# Patient Record
Sex: FEMALE
[De-identification: names, ages, dates, MRNs, and addresses within clinical notes are randomized per-mention and may not be internally consistent; named-entity substitution may affect disease eponyms.]

---

## 2017-06-01 ENCOUNTER — HOSPITAL ENCOUNTER (EMERGENCY)
Dept: HOSPITAL 31 - C.ER | Age: 37
Discharge: HOME | End: 2017-06-01
Payer: COMMERCIAL

## 2017-06-01 VITALS
OXYGEN SATURATION: 98 % | SYSTOLIC BLOOD PRESSURE: 110 MMHG | RESPIRATION RATE: 14 BRPM | TEMPERATURE: 98.5 F | DIASTOLIC BLOOD PRESSURE: 60 MMHG

## 2017-06-01 VITALS — BODY MASS INDEX: 23.6 KG/M2

## 2017-06-01 VITALS — HEART RATE: 80 BPM

## 2017-06-01 DIAGNOSIS — J32.3: ICD-10-CM

## 2017-06-01 DIAGNOSIS — J01.00: Primary | ICD-10-CM

## 2017-06-01 LAB
ALBUMIN/GLOB SERPL: 1.3 {RATIO}
ALP SERPL-CCNC: 64 U/L
ALT SERPL-CCNC: 27 U/L
AST SERPL-CCNC: 23 U/L
BASOPHILS # BLD AUTO: 0.1 K/UL
BASOPHILS NFR BLD: 1.1 %
BILIRUB SERPL-MCNC: 0.9 MG/DL
BILIRUB UR-MCNC: NEGATIVE MG/DL
BUN SERPL-MCNC: 13 MG/DL
CALCIUM SERPL-MCNC: 9 MG/DL
CHLORIDE SERPL-SCNC: 101 MMOL/L
CO2 SERPL-SCNC: 27 MMOL/L
EOSINOPHIL # BLD AUTO: 0.1 K/UL
EOSINOPHIL NFR BLD: 1.7 %
ERYTHROCYTE [DISTWIDTH] IN BLOOD BY AUTOMATED COUNT: 17.4 %
GLOBULIN SER-MCNC: 3.2 GM/DL
GLUCOSE SERPL-MCNC: 85 MG/DL
GLUCOSE UR STRIP-MCNC: NORMAL MG/DL
HCT VFR BLD CALC: 29.7 %
KETONES UR STRIP-MCNC: NEGATIVE MG/DL
LEUKOCYTE ESTERASE UR-ACNC: (no result) LEU/UL
LYMPHOCYTES # BLD AUTO: 2.2 K/UL
LYMPHOCYTES NFR BLD AUTO: 37.9 %
MCH RBC QN AUTO: 22.4 PG
MCHC RBC AUTO-ENTMCNC: 31.3 G/DL
MCV RBC AUTO: 71.4 FL
MONOCYTES # BLD: 0.5 K/UL
MONOCYTES NFR BLD: 7.8 %
NRBC BLD AUTO-RTO: 0 %
PH UR STRIP: 5 [PH]
PLATELET # BLD: 345 K/UL
PMV BLD AUTO: 8.4 FL
POTASSIUM SERPL-SCNC: 3.7 MMOL/L
PROT SERPL-MCNC: 7.2 G/DL
PROT UR STRIP-MCNC: NEGATIVE MG/DL
RBC # UR STRIP: NEGATIVE /UL
RBC #/AREA URNS HPF: < 1 /HPF
SODIUM SERPL-SCNC: 136 MMOL/L
SP GR UR STRIP: 1.02
UROBILINOGEN UR-MCNC: NORMAL MG/DL
WBC # BLD AUTO: 5.8 K/UL
WBC #/AREA URNS HPF: 1 /HPF

## 2017-06-01 PROCEDURE — 84703 CHORIONIC GONADOTROPIN ASSAY: CPT

## 2017-06-01 PROCEDURE — 70450 CT HEAD/BRAIN W/O DYE: CPT

## 2017-06-01 PROCEDURE — 93005 ELECTROCARDIOGRAM TRACING: CPT

## 2017-06-01 PROCEDURE — 85025 COMPLETE CBC W/AUTO DIFF WBC: CPT

## 2017-06-01 PROCEDURE — 96375 TX/PRO/DX INJ NEW DRUG ADDON: CPT

## 2017-06-01 PROCEDURE — 84484 ASSAY OF TROPONIN QUANT: CPT

## 2017-06-01 PROCEDURE — 99285 EMERGENCY DEPT VISIT HI MDM: CPT

## 2017-06-01 PROCEDURE — 81001 URINALYSIS AUTO W/SCOPE: CPT

## 2017-06-01 PROCEDURE — 96374 THER/PROPH/DIAG INJ IV PUSH: CPT

## 2017-06-01 PROCEDURE — 82948 REAGENT STRIP/BLOOD GLUCOSE: CPT

## 2017-06-01 PROCEDURE — 80053 COMPREHEN METABOLIC PANEL: CPT

## 2017-06-01 NOTE — CT
EXAM:

  CT Head Without Intravenous Contrast



CLINICAL HISTORY:

  37 years old, female; Signs and symptoms; Other: Vertigo; Additional info: 

Headache



TECHNIQUE:

  Axial computed tomography images of the head/brain without intravenous 

contrast.  This CT exam was performed using one or more of the following dose 

reduction techniques:  automated exposure control, adjustment of the mA and/or 

kV according to patient size, and/or use of iterative reconstruction technique.



EXAM DATE/TIME:

  Exam ordered 6/1/2017 7:47 PM



COMPARISON:

  No relevant prior studies available.



FINDINGS:

  Brain:  Tiny calcification is noted in the right basal ganglia.  No 

hemorrhage.  No significant white matter disease.  No edema.

  Ventricles:  Unremarkable.  No ventriculomegaly.

  Bones/joints:  Unremarkable.  No acute fracture.

  Soft tissues:  Unremarkable.

  Sinuses:  There is an air-fluid level noted within the left maxillary sinus.  

Mucosal thickening is noted in the right sphenoid sinus.

  Mastoid air cells:  Unremarkable as visualized.  No mastoid effusion.



IMPRESSION:     

1. Normal CT brain



2 Acute left maxillary sinusitis. Chronic sinusitis of the right sphenoid

## 2017-06-01 NOTE — C.PDOC
History Of Present Illness





A 36 y/o female c/o headache for 3 months. Pt notes having vertigo yesterday. 

Pt reports nausea and palpitations but denies fever, chills, head trauma, 

visual changes, photophobia, or any other complaints.








Time Seen by Provider: 17 19:40


Chief Complaint (Nursing): Headache


History Per: Patient


History/Exam Limitations: no limitations


Onset/Duration Of Symptoms: Days


Current Symptoms Are (Timing): Still Present


Severity: Mild


Associated Symptoms: Nausea, Other (Vertigo).  denies: Photophobia, Blurred 

Vision


Recent travel outside of the [place]: No


Additional History Per: Patient





Past Medical History


Reviewed: Historical Data, Nursing Documentation, Vital Signs


Vital Signs: 


 Last Vital Signs











Temp  98.6 F   17 18:12


 


Pulse  80   17 18:12


 


Resp  18   17 18:12


 


BP  110/54 L  17 18:12


 


Pulse Ox  100   17 21:05














- Medical History


PMH: Anemia, Migraine


Surgical History: Appendectomy, Cholecystectomy


Family History: States: Unknown Family Hx





- Social History


Hx Alcohol Use: No


Hx Substance Use: No





- Immunization History


Hx Tetanus Toxoid Vaccination: No


Hx Influenza Vaccination: No


Hx Pneumococcal Vaccination: No





Review Of Systems


Except As Marked, All Systems Reviewed And Found Negative.


Constitutional: Negative for: Fever, Chills, Other (Head trauma)


Eyes: Negative for: Vision Change, Other (Photophobia)


Cardiovascular: Positive for: Palpitations


Gastrointestinal: Positive for: Nausea


Neurological: Positive for: Headache, Other (V)





Physical Exam





- Physical Exam


Appears: Non-toxic, No Acute Distress


Skin: Warm, Dry


Head: Atraumatic, Normacephalic


Eye(s): bilateral: Other (lateral nystagmus)


Oral Mucosa: Moist


Throat: Normal, No Exudate


Neck: Supple


Chest: Symmetrical


Cardiovascular: Rhythm Regular, No Murmur


Respiratory: Normal Breath Sounds, No Accessory Muscle Use, No Rales, No Rhonchi

, No Wheezing


Gastrointestinal/Abdominal: Soft, No Tenderness


Neurological/Psych: Oriented x3, Normal Speech, Normal Motor, Normal Sensation, 

Other (No focal deficit)





ED Course And Treatment





- Laboratory Results


Result Diagrams: 


 17 19:59





 17 19:59


ECG: Interpreted By Me, Viewed By Me


ECG Rhythm: Sinus Rhythm


ECG Interpretation: Normal, No Acute Changes


Interpretation Of ECG: NSR, normal tracings.


Rate From EC


O2 Sat by Pulse Oximetry: 100 (RA)


Pulse Ox Interpretation: Normal





- CT Scan/US


  ** CT Head w/o contrast


Other Rad Studies (CT/US): Interpreted By Me, Read By Radiologist


CT/US Interpretation: EXAM:  CT Head Without Intravenous Contrast.  CLINICAL 

HISTORY:  37 years old, female; Signs and symptoms; Other: Vertigo; Additional 

info: Headache.  TECHNIQUE:  Axial computed tomography images of the head/brain 

without intravenous contrast. This CT exam.  was performed using one or more of 

the following dose reduction techniques: automated exposure.  control, 

adjustment of the mA and/or kV according to patient size, and/or use of 

iterative.  reconstruction technique.  EXAM DATE/TIME:  Exam ordered 2017 7:

47 PM.  COMPARISON:  No relevant prior studies available.  FINDINGS:  Brain: 

Tiny calcification is noted in the right basal ganglia. No hemorrhage. No 

significant white.  matter disease. No edema.  Ventricles: Unremarkable. No 

ventriculomegaly.  Bones/joints: Unremarkable. No acute fracture.  Soft tissues

: Unremarkable.  Sinuses: There is an air-fluid level noted within the left 

maxillary sinus. Mucosal thickening is noted.  in the right sphenoid sinus.  

Mastoid air cells: Unremarkable as visualized. No mastoid effusion.  IMPRESSION

:  1. Normal CT brain.  2 Acute left maxillary sinusitis. Chronic sinusitis of 

the right sphenoid





Medical Decision Making


Medical Decision Making: 





Impression: 36 y/o female c/o headache for 3 months





Plans: CT Head, EKG, Toradol, Ativan, Antivert, IV fluids, reassess





Disposition


Counseled Patient/Family Regarding: Diagnosis





- Disposition


Referrals: 


[facility] [Outside]


Disposition: HOME/ ROUTINE


Disposition Time: 20:47


Condition: STABLE


Prescriptions: 


Amoxicillin [Amoxil 500 mg Cap] 500 mg PO TID #20 cap


Meclizine [Antivert] 12.5 mg PO TID #20 tab


Naproxen [Naprosyn Tab] 375 mg PO TIDPC #20 tab


Instructions:  Sinusitis (ED), Vertigo (ED), General Headache (ED)


Forms:  Gen Discharge Inst German


Print Language: [place]





- POA


Present On Arrival: None





- Clinical Impression


Clinical Impression: 


 Headache, Sinusitis, Vertigo








- Scribe Statement


The provider has reviewed the documentation as recorded by the Rameshibkee gutierrez





All medical record entries made by the Rameshibkee were at my direction and 

personally dictated by me. I have reviewed the chart and agree that the record 

accurately reflects my personal performance of the history, physical exam, 

medical decision making, and the department course for this patient. I have 

also personally directed, reviewed, and agree with the discharge instructions 

and disposition.

## 2017-06-02 NOTE — CARD
--------------- APPROVED REPORT --------------





EKG Measurement

Heart [id number]

OR [id number]

[id number]

[id number]

[id number]



<Conclusion>

Normal sinus rhythm

Normal ECG

## 2017-07-12 ENCOUNTER — HOSPITAL ENCOUNTER (EMERGENCY)
Dept: HOSPITAL 31 - C.ER | Age: 37
LOS: 1 days | Discharge: HOME | End: 2017-07-13
Payer: SELF-PAY

## 2017-07-12 VITALS — BODY MASS INDEX: 23.6 KG/M2

## 2017-07-12 VITALS — RESPIRATION RATE: 16 BRPM

## 2017-07-12 DIAGNOSIS — R10.31: ICD-10-CM

## 2017-07-12 DIAGNOSIS — D25.9: ICD-10-CM

## 2017-07-12 DIAGNOSIS — N39.0: Primary | ICD-10-CM

## 2017-07-12 LAB
BASOPHILS # BLD AUTO: 0.1 K/UL (ref 0–0.2)
BASOPHILS NFR BLD: 1.3 % (ref 0–2)
EOSINOPHIL # BLD AUTO: 0.1 K/UL (ref 0–0.7)
EOSINOPHIL NFR BLD: 1.9 % (ref 0–4)
ERYTHROCYTE [DISTWIDTH] IN BLOOD BY AUTOMATED COUNT: 16.9 % (ref 11.5–14.5)
HCG,QUALITATIVE URINE: NEGATIVE
HGB BLD-MCNC: 9.8 G/DL (ref 11–16)
LYMPHOCYTES # BLD AUTO: 2.5 K/UL (ref 1–4.3)
LYMPHOCYTES NFR BLD AUTO: 39.4 % (ref 20–40)
MCH RBC QN AUTO: 22.1 PG (ref 27–31)
MCHC RBC AUTO-ENTMCNC: 31 G/DL (ref 33–37)
MCV RBC AUTO: 71.3 FL (ref 81–99)
MONOCYTES # BLD: 0.6 K/UL (ref 0–0.8)
MONOCYTES NFR BLD: 9.5 % (ref 0–10)
NEUTROPHILS # BLD: 3.1 K/UL (ref 1.8–7)
NEUTROPHILS NFR BLD AUTO: 47.9 % (ref 50–75)
NRBC BLD AUTO-RTO: 0.1 % (ref 0–2)
PLATELET # BLD: 358 K/UL (ref 130–400)
PMV BLD AUTO: 8.4 FL (ref 7.2–11.7)
RBC # BLD AUTO: 4.44 MIL/UL (ref 3.8–5.2)
WBC # BLD AUTO: 6.4 K/UL (ref 4.8–10.8)

## 2017-07-12 PROCEDURE — 85025 COMPLETE CBC W/AUTO DIFF WBC: CPT

## 2017-07-12 PROCEDURE — 96365 THER/PROPH/DIAG IV INF INIT: CPT

## 2017-07-12 PROCEDURE — 96366 THER/PROPH/DIAG IV INF ADDON: CPT

## 2017-07-12 PROCEDURE — 83690 ASSAY OF LIPASE: CPT

## 2017-07-12 PROCEDURE — 84702 CHORIONIC GONADOTROPIN TEST: CPT

## 2017-07-12 PROCEDURE — 99284 EMERGENCY DEPT VISIT MOD MDM: CPT

## 2017-07-12 PROCEDURE — 87086 URINE CULTURE/COLONY COUNT: CPT

## 2017-07-12 PROCEDURE — 84703 CHORIONIC GONADOTROPIN ASSAY: CPT

## 2017-07-12 PROCEDURE — 81001 URINALYSIS AUTO W/SCOPE: CPT

## 2017-07-12 PROCEDURE — 74177 CT ABD & PELVIS W/CONTRAST: CPT

## 2017-07-12 PROCEDURE — 80053 COMPREHEN METABOLIC PANEL: CPT

## 2017-07-12 PROCEDURE — 96375 TX/PRO/DX INJ NEW DRUG ADDON: CPT

## 2017-07-13 VITALS
DIASTOLIC BLOOD PRESSURE: 62 MMHG | OXYGEN SATURATION: 100 % | SYSTOLIC BLOOD PRESSURE: 97 MMHG | HEART RATE: 64 BPM | TEMPERATURE: 98 F

## 2017-07-13 LAB
ALBUMIN SERPL-MCNC: 4.2 G/DL (ref 3.5–5)
ALBUMIN/GLOB SERPL: 1.1 {RATIO} (ref 1–2.1)
ALT SERPL-CCNC: 33 U/L (ref 9–52)
AST SERPL-CCNC: 30 U/L (ref 14–36)
BACTERIA #/AREA URNS HPF: (no result) /[HPF]
BILIRUB UR-MCNC: (no result) MG/DL
BUN SERPL-MCNC: 11 MG/DL (ref 7–17)
CALCIUM SERPL-MCNC: 9.2 MG/DL (ref 8.6–10.4)
GFR NON-AFRICAN AMERICAN: > 60
GLUCOSE UR STRIP-MCNC: NORMAL MG/DL
LEUKOCYTE ESTERASE UR-ACNC: (no result) LEU/UL
LIPASE: 125 U/L (ref 23–300)
PH UR STRIP: 5 [PH] (ref 5–8)
PROT UR STRIP-MCNC: (no result) MG/DL
RBC # UR STRIP: (no result) /UL
SP GR UR STRIP: 1.04 (ref 1–1.03)
SQUAMOUS EPITHIAL: 12 /HPF (ref 0–5)
URINE NITRATE: NEGATIVE
UROBILINOGEN UR-MCNC: NORMAL MG/DL (ref 0.2–1)

## 2017-07-13 NOTE — C.PDOC
History Of Present Illness


Patient is a 38 y/o female, with history of ovarian cyst, that presents to the 

ED for evaluation of severe lower abdominal pain associated with nausea, 

vomiting, and dysuria for the last 7 days. Otherwise, denies any diarrhea, 

hematuria, back pain, fever, chills, or any other associated symptoms at this 

time. 





Chief Complaint (Nursing): Abdominal Pain


History Per: Patient


History/Exam Limitations: no limitations


Onset/Duration Of Symptoms: Days (7)


Current Symptoms Are (Timing): Still Present


Severity: Severe


Location Of Pain/Discomfort: RLQ, LLQ


Radiation Of Pain To:: None


Quality Of Discomfort: "Pain"


Associated Symptoms: Nausea, Vomiting, Urinary Symptoms (dysuria).  denies: 

Fever, Chills, Diarrhea, Loss Of Appetite, Back Pain, Chest Pain, Constipation


Exacerbating Factors: None


Alleviating Factors: None


Recent travel outside of the [place]: No


Additional History Per: Patient


Abnormal Vaginal Bleeding: No





Past Medical History


Reviewed: Historical Data, Nursing Documentation, Vital Signs


Vital Signs: 


 Last Vital Signs











Temp  98.4 F   07/12/17 23:33


 


Pulse  75   07/12/17 23:33


 


Resp  16   07/12/17 23:33


 


BP  105/69   07/12/17 23:33


 


Pulse Ox  99   07/13/17 02:30














- Medical History


PMH: Anemia, Migraine


Surgical History: Appendectomy, Cholecystectomy


Family History: States: Unknown Family Hx





- Social History


Hx Alcohol Use: No


Hx Substance Use: No





- Immunization History


Hx Tetanus Toxoid Vaccination: No


Hx Influenza Vaccination: No


Hx Pneumococcal Vaccination: No





Review Of Systems


Except As Marked, All Systems Reviewed And Found Negative.


Constitutional: Negative for: Fever, Chills


Gastrointestinal: Positive for: Nausea, Vomiting, Abdominal Pain.  Negative for

: Diarrhea, Constipation, Hematemesis


Genitourinary: Positive for: Dysuria.  Negative for: Frequency, Incontinence, 

Hematuria


Musculoskeletal: Negative for: Back Pain





Physical Exam





- Physical Exam


Appears: Non-toxic, No Acute Distress


Skin: Normal Color, Warm, Dry


Head: Atraumatic, Normacephalic


Eye(s): bilateral: Normal Inspection, EOMI


Chest: Symmetrical


Cardiovascular: Rhythm Regular, No Murmur


Respiratory: Normal Breath Sounds, No Rales, No Rhonchi, No Wheezing


Gastrointestinal/Abdominal: Soft, Tenderness (RLQ, LLQ), No Distention, No 

Guarding, No Rebound


Extremity: Normal ROM


Neurological/Psych: Oriented x3, Normal Speech, Normal Cognition





ED Course And Treatment





- Laboratory Results


Result Diagrams: 


 07/12/17 23:53





 07/12/17 23:53


O2 Sat by Pulse Oximetry: 99 (on RA)


Pulse Ox Interpretation: Normal





- CT Scan/US


  ** Abd & pelvis CT


Other Rad Studies (CT/US): Read By Radiologist, Radiology Report Reviewed


CT/US Interpretation: FINDINGS:  Lower thorax: No acute findings.  ABDOMEN:  

Liver: Small cyst in the dome of the right hepatic lobe.  Gallbladder and bile 

ducts: Cholecystectomy.  No ductal dilation.  Pancreas: Unremarkable. No mass. 

No ductal dilation.  Spleen: Unremarkable. No splenomegaly.  Adrenals: 

Unremarkable. No mass.  Kidneys and ureters: Small cyst in the upper pole left 

kidney.  No hydronephrosis.  Stomach and bowel: Moderate fecal retention in the 

right and transverse colon consistent with.  constipation.  Appendix: Not seen.

  PELVIS:  Bladder: Unremarkable. No mass.  Reproductive: Probable fibroid in 

the anterior uterus.  ABDOMEN and PELVIS:  Intraperitoneal space: Unremarkable. 

No free air. No significant fluid collection.  Bones/joints: No acute fracture. 

No dislocation.  Soft tissues: Unremarkable.  Vasculature: Unremarkable. No 

abdominal aortic aneurysm.  Lymph nodes: Unremarkable. No enlarged lymph nodes.

  IMPRESSION:  Moderate fecal retention in the right and transverse colon 

consistent with constipation.


Progress Note: Blood work, urinalysis, Abd & pelvis CT ordered and reviewed. 

Patient was treated with Iohexol, Toradol, and IV fluids in the ER.





Disposition


Counseled Patient/Family Regarding: Diagnosis





- Disposition


Referrals: 


[facility] [Outside]


Disposition: HOME/ ROUTINE


Disposition Time: 02:26


Condition: STABLE


Prescriptions: 


Ciprofloxacin [Cipro] 1 tab PO BID #14 tab


Phenazopyridine HCl [Pyridium] 200 mg PO TID #20 tablet


Instructions:  Urinary Tract Infection in Women (GEN), Abdominal Pain (ED), 

Uterine Fibroids (ED)


Forms:  Gen Discharge Inst Sami


Print Language: Sami





- POA


Present On Arrival: None





- Clinical Impression


Clinical Impression: 


 Abdominal pain, Urinary tract infection, Uterine fibroid








- Scribe Statement


The provider has reviewed the documentation as recorded by the Juve Vargas





All medical record entries made by the Rameshibkee were at my direction and 

personally dictated by me. I have reviewed the chart and agree that the record 

accurately reflects my personal performance of the history, physical exam, 

medical decision making, and the department course for this patient. I have 

also personally directed, reviewed, and agree with the discharge instructions 

and disposition.

## 2017-07-13 NOTE — CT
PROCEDURE:  CT Abdomen and Pelvis with contrast



HISTORY:

lower abd pain/ tenderness



COMPARISON:

None.



TECHNIQUE:

Contrast dose: 100 mL Visipaque 320



Radiation dose:



Total exam DLP = 279 mGy-cm.



This CT exam was performed using one or more of the following dose 

reduction techniques: Automated exposure control, adjustment of the 

mA and/or kV according to patient size, and/or use of iterative 

reconstruction technique.



FINDINGS:



LOWER THORAX:

Unremarkable. 



LIVER:

Probable small  benign cyst and/or hemangioma -posterior segment 

-right hepatic lobe- dome level. No ductal dilatation. 



GALLBLADDER AND BILE DUCTS:

Cholecystectomy. No pathological dilated ducts 



PANCREAS:

Unremarkable. No gross lesion or ductal dilatation.



SPLEEN:

Unremarkable. 



ADRENALS:

Unremarkable. No mass. 



KIDNEYS AND URETERS:

5 mm left renal upper pole intra cortical hypodense lesion -probable 

cyst. No hydronephrosis. No suspicious-appearing mass. 



VASCULATURE:

Unremarkable. No aortic aneurysm. 



BOWEL:

Redundancy and ptosis with marked stool retention-transverse colon. 

No obstruction. No gross mural thickening. 



APPENDIX:

Not definitively identified



No gross pericecal inflammatory changes 



PERITONEUM:

Unremarkable. No free fluid. No free air. 



LYMPH NODES:

Unremarkable. No enlarged lymph nodes. 



BLADDER:

Unremarkable. 



REPRODUCTIVE:

Probable anterior posterior fibroids (axial series 3, image 137).  



BONES:

No acute fracture. 



OTHER FINDINGS:

None.



IMPRESSION:

Appendix not identified. No pericecal inflammatory changes



Uterine fibroids - approximately 1 to 1.5 cm in size intramural to 

extramural of sub serosal in appearance mostly midline.This is an 

interval change in the initial V rad report. 



Comments: Preliminary report provided by V rad

## 2017-07-14 ENCOUNTER — HOSPITAL ENCOUNTER (EMERGENCY)
Dept: HOSPITAL 31 - C.ER | Age: 37
Discharge: HOME | End: 2017-07-14
Payer: SELF-PAY

## 2017-07-14 VITALS
HEART RATE: 65 BPM | TEMPERATURE: 97.7 F | SYSTOLIC BLOOD PRESSURE: 96 MMHG | DIASTOLIC BLOOD PRESSURE: 60 MMHG | OXYGEN SATURATION: 99 %

## 2017-07-14 VITALS — RESPIRATION RATE: 18 BRPM

## 2017-07-14 VITALS — BODY MASS INDEX: 23.6 KG/M2

## 2017-07-14 DIAGNOSIS — N39.0: Primary | ICD-10-CM

## 2017-07-14 LAB
ALBUMIN SERPL-MCNC: 3.5 G/DL (ref 3.5–5)
ALBUMIN/GLOB SERPL: 1.1 {RATIO} (ref 1–2.1)
ALT SERPL-CCNC: 41 U/L (ref 9–52)
AST SERPL-CCNC: 33 U/L (ref 14–36)
BASOPHILS # BLD AUTO: 0.1 K/UL (ref 0–0.2)
BASOPHILS NFR BLD: 1.1 % (ref 0–2)
BILIRUB UR-MCNC: NEGATIVE MG/DL
BUN SERPL-MCNC: 13 MG/DL (ref 7–17)
CALCIUM SERPL-MCNC: 8.5 MG/DL (ref 8.6–10.4)
EOSINOPHIL # BLD AUTO: 0.2 K/UL (ref 0–0.7)
EOSINOPHIL NFR BLD: 2.9 % (ref 0–4)
ERYTHROCYTE [DISTWIDTH] IN BLOOD BY AUTOMATED COUNT: 16.8 % (ref 11.5–14.5)
GFR NON-AFRICAN AMERICAN: > 60
GLUCOSE UR STRIP-MCNC: NORMAL MG/DL
HGB BLD-MCNC: 8.7 G/DL (ref 11–16)
LEUKOCYTE ESTERASE UR-ACNC: (no result) LEU/UL
LYMPHOCYTES # BLD AUTO: 2.5 K/UL (ref 1–4.3)
LYMPHOCYTES NFR BLD AUTO: 38 % (ref 20–40)
MCH RBC QN AUTO: 22.1 PG (ref 27–31)
MCHC RBC AUTO-ENTMCNC: 30.8 G/DL (ref 33–37)
MCV RBC AUTO: 72 FL (ref 81–99)
MONOCYTES # BLD: 0.5 K/UL (ref 0–0.8)
MONOCYTES NFR BLD: 7.2 % (ref 0–10)
NEUTROPHILS # BLD: 3.3 K/UL (ref 1.8–7)
NEUTROPHILS NFR BLD AUTO: 50.8 % (ref 50–75)
NRBC BLD AUTO-RTO: 0 % (ref 0–2)
PH UR STRIP: 7 [PH] (ref 5–8)
PLATELET # BLD: 306 K/UL (ref 130–400)
PMV BLD AUTO: 8.7 FL (ref 7.2–11.7)
PROT UR STRIP-MCNC: NEGATIVE MG/DL
RBC # BLD AUTO: 3.91 MIL/UL (ref 3.8–5.2)
RBC # UR STRIP: NEGATIVE /UL
SP GR UR STRIP: 1.02 (ref 1–1.03)
SQUAMOUS EPITHIAL: 3 /HPF (ref 0–5)
URINE AMORPHOUS SEDIMENT: (no result) /UL
URINE NITRATE: NEGATIVE
UROBILINOGEN UR-MCNC: 4 MG/DL (ref 0.2–1)
WBC # BLD AUTO: 6.5 K/UL (ref 4.8–10.8)

## 2017-07-14 PROCEDURE — 96375 TX/PRO/DX INJ NEW DRUG ADDON: CPT

## 2017-07-14 PROCEDURE — 87086 URINE CULTURE/COLONY COUNT: CPT

## 2017-07-14 PROCEDURE — 99285 EMERGENCY DEPT VISIT HI MDM: CPT

## 2017-07-14 PROCEDURE — 85025 COMPLETE CBC W/AUTO DIFF WBC: CPT

## 2017-07-14 PROCEDURE — 81001 URINALYSIS AUTO W/SCOPE: CPT

## 2017-07-14 PROCEDURE — 96374 THER/PROPH/DIAG INJ IV PUSH: CPT

## 2017-07-14 PROCEDURE — 96361 HYDRATE IV INFUSION ADD-ON: CPT

## 2017-07-14 PROCEDURE — 80053 COMPREHEN METABOLIC PANEL: CPT

## 2017-07-14 NOTE — C.PDOC
History Of Present Illness


38 y/o female presents to ED for evaluation of lower abdominal pain radiating 

to right flank and lower back since yesterday morning. Pt reports associated 

nausea. Patient was seen yesterday, was diagnosed with UTI, and discharged home 

with prescriptions. Pt states her pain still persists despite taking prescribed 

medications. Otherwise, denies fever, vomiting, diarrhea, or urinary symptoms. 





Time Seen by Provider: 17 03:11


Chief Complaint (Nursing): Abdominal Pain


History Per: Patient


History/Exam Limitations: no limitations


Onset/Duration Of Symptoms: Days (1)


Current Symptoms Are (Timing): Still Present


Location Of Pain/Discomfort: RLQ, LLQ


Radiation Of Pain To:: Back, Flank


Quality Of Discomfort: "Pain"


Associated Symptoms: Nausea, Back Pain.  denies: Fever, Chills, Vomiting, 

Diarrhea, Loss Of Appetite, Chest Pain, Constipation, Urinary Symptoms


Exacerbating Factors: None


Alleviating Factors: None


Recent travel outside of the [place]: No


Additional History Per: Patient


Abnormal Vaginal Bleeding: No





Past Medical History


Reviewed: Historical Data, Nursing Documentation, Vital Signs


Vital Signs: 


 Last Vital Signs











Temp  97.9 F   17 04:24


 


Pulse  53 L  17 04:24


 


Resp  18   17 04:24


 


BP  90/54 L  17 04:24


 


Pulse Ox  100   17 04:24














- Medical History


PMH: Anemia, Migraine


Surgical History: Appendectomy, Cholecystectomy


Family History: States: Unknown Family Hx





- Social History


Hx Alcohol Use: No


Hx Substance Use: No





- Immunization History


Hx Tetanus Toxoid Vaccination: No


Hx Influenza Vaccination: No


Hx Pneumococcal Vaccination: No





Review Of Systems


Except As Marked, All Systems Reviewed And Found Negative.


Constitutional: Negative for: Fever, Chills


Gastrointestinal: Positive for: Nausea, Abdominal Pain.  Negative for: Vomiting

, Diarrhea


Genitourinary: Negative for: Dysuria, Frequency, Incontinence, Hematuria


Musculoskeletal: Positive for: Back Pain


Skin: Negative for: Rash, Bruising


Neurological: Negative for: Weakness, Numbness





Physical Exam





- Physical Exam


Appears: Non-toxic, No Acute Distress


Skin: Normal Color, Warm, Dry


Head: Atraumatic, Normacephalic


Neck: Normal ROM, Supple


Chest: Symmetrical


Cardiovascular: Rhythm Regular, No Murmur


Respiratory: Normal Breath Sounds, No Rales, No Rhonchi, No Wheezing


Gastrointestinal/Abdominal: Soft, Tenderness (lower abdomen), No Guarding, No 

Rebound


Back: CVA Tenderness (right), No Vertebral Tenderness, No Paraspinal Tenderness


Extremity: Normal ROM


Neurological/Psych: Oriented x3, Normal Speech, Normal Cognition





ED Course And Treatment





- Laboratory Results


Result Diagrams: 


 17 04:00





 17 04:00


O2 Sat by Pulse Oximetry: 100


Pulse Ox Interpretation: Normal


Progress Note: Blood work, urinalysis ordered and reviewed. Patient was given 

IV fluids, Zofran, Toradol, and Rocephin.





Medical Decision Making


Medical Decision Makin the pt reports feeling better after rx. she is aware of her anemia. she 

appears well in no distress. vitals stable. ua improved will continue cipro. 





Disposition





- Disposition


Referrals: 


Carolyn Benítez MD [Primary Care Provider] - 


Disposition Time: 05:24


Condition: IMPROVED


Forms:  Gen Discharge Inst Hungarian





- Clinical Impression


Clinical Impression: 


 UTI (urinary tract infection)








- Scribe Statement


The provider has reviewed the documentation as recorded by the Rameshibkee Vargas





All medical record entries made by the Rameshibkee were at my direction and 

personally dictated by me. I have reviewed the chart and agree that the record 

accurately reflects my personal performance of the history, physical exam, 

medical decision making, and the department course for this patient. I have 

also personally directed, reviewed, and agree with the discharge instructions 

and disposition.

## 2017-07-22 ENCOUNTER — HOSPITAL ENCOUNTER (EMERGENCY)
Dept: HOSPITAL 31 - C.ER | Age: 37
LOS: 1 days | Discharge: HOME | End: 2017-07-23
Payer: SELF-PAY

## 2017-07-22 VITALS — BODY MASS INDEX: 23.6 KG/M2

## 2017-07-22 DIAGNOSIS — R10.30: Primary | ICD-10-CM

## 2017-07-22 LAB
ALBUMIN SERPL-MCNC: 4.1 G/DL (ref 3.5–5)
ALBUMIN/GLOB SERPL: 1.2 {RATIO} (ref 1–2.1)
ALT SERPL-CCNC: 31 U/L (ref 9–52)
APTT BLD: 29 SECONDS (ref 21–34)
AST SERPL-CCNC: 23 U/L (ref 14–36)
BASOPHILS # BLD AUTO: 0.1 K/UL (ref 0–0.2)
BASOPHILS NFR BLD: 1.1 % (ref 0–2)
BILIRUB UR-MCNC: NEGATIVE MG/DL
BUN SERPL-MCNC: 18 MG/DL (ref 7–17)
CALCIUM SERPL-MCNC: 8.2 MG/DL (ref 8.6–10.4)
EOSINOPHIL # BLD AUTO: 0.2 K/UL (ref 0–0.7)
EOSINOPHIL NFR BLD: 2.7 % (ref 0–4)
ERYTHROCYTE [DISTWIDTH] IN BLOOD BY AUTOMATED COUNT: 16.6 % (ref 11.5–14.5)
GFR NON-AFRICAN AMERICAN: > 60
GLUCOSE UR STRIP-MCNC: NORMAL MG/DL
HGB BLD-MCNC: 9.5 G/DL (ref 11–16)
INR PPP: 1
LEUKOCYTE ESTERASE UR-ACNC: (no result) LEU/UL
LIPASE: 186 U/L (ref 23–300)
LYMPHOCYTES # BLD AUTO: 2.7 K/UL (ref 1–4.3)
LYMPHOCYTES NFR BLD AUTO: 37 % (ref 20–40)
MCH RBC QN AUTO: 22.4 PG (ref 27–31)
MCHC RBC AUTO-ENTMCNC: 31.2 G/DL (ref 33–37)
MCV RBC AUTO: 71.9 FL (ref 81–99)
MONOCYTES # BLD: 0.5 K/UL (ref 0–0.8)
MONOCYTES NFR BLD: 6.9 % (ref 0–10)
NEUTROPHILS # BLD: 3.8 K/UL (ref 1.8–7)
NEUTROPHILS NFR BLD AUTO: 52.3 % (ref 50–75)
NRBC BLD AUTO-RTO: 0 % (ref 0–2)
PH UR STRIP: 5 [PH] (ref 5–8)
PLATELET # BLD: 312 K/UL (ref 130–400)
PMV BLD AUTO: 8.9 FL (ref 7.2–11.7)
PROT UR STRIP-MCNC: NEGATIVE MG/DL
PROTHROMBIN TIME: 11.4 SECONDS (ref 9.7–12.2)
RBC # BLD AUTO: 4.22 MIL/UL (ref 3.8–5.2)
RBC # UR STRIP: NEGATIVE /UL
SP GR UR STRIP: 1.03 (ref 1–1.03)
SQUAMOUS EPITHIAL: 9 /HPF (ref 0–5)
URINE NITRATE: NEGATIVE
UROBILINOGEN UR-MCNC: NORMAL MG/DL (ref 0.2–1)
WBC # BLD AUTO: 7.3 K/UL (ref 4.8–10.8)

## 2017-07-22 PROCEDURE — 84703 CHORIONIC GONADOTROPIN ASSAY: CPT

## 2017-07-22 PROCEDURE — 96361 HYDRATE IV INFUSION ADD-ON: CPT

## 2017-07-22 PROCEDURE — 96374 THER/PROPH/DIAG INJ IV PUSH: CPT

## 2017-07-22 PROCEDURE — 74177 CT ABD & PELVIS W/CONTRAST: CPT

## 2017-07-22 PROCEDURE — 81001 URINALYSIS AUTO W/SCOPE: CPT

## 2017-07-22 PROCEDURE — 87591 N.GONORRHOEAE DNA AMP PROB: CPT

## 2017-07-22 PROCEDURE — 80053 COMPREHEN METABOLIC PANEL: CPT

## 2017-07-22 PROCEDURE — 85025 COMPLETE CBC W/AUTO DIFF WBC: CPT

## 2017-07-22 PROCEDURE — 96375 TX/PRO/DX INJ NEW DRUG ADDON: CPT

## 2017-07-22 PROCEDURE — 96360 HYDRATION IV INFUSION INIT: CPT

## 2017-07-22 PROCEDURE — 85610 PROTHROMBIN TIME: CPT

## 2017-07-22 PROCEDURE — 99285 EMERGENCY DEPT VISIT HI MDM: CPT

## 2017-07-22 PROCEDURE — 87086 URINE CULTURE/COLONY COUNT: CPT

## 2017-07-22 PROCEDURE — 87491 CHLMYD TRACH DNA AMP PROBE: CPT

## 2017-07-22 PROCEDURE — 85730 THROMBOPLASTIN TIME PARTIAL: CPT

## 2017-07-22 PROCEDURE — 83690 ASSAY OF LIPASE: CPT

## 2017-07-22 NOTE — C.PDOC
History Of Present Illness


37 year old female who presents to the ER with a complaint of lower abdominal 

pain for the past week. Patient reports she was seen here in the ER 1 week ago 

for a UTI. pt states she also developed white vaginal d/c after taking 

antibiotics. pt states sexiually active with . Denies fever, nausea, or 

vomiting, diarrhea. info obtained via  in er. 


Time Seen by Provider: 07/22/17 23:09


Chief Complaint (Nursing): Abdominal Pain


History Per: Patient


History/Exam Limitations: no limitations


Onset/Duration Of Symptoms: Days (7)


Current Symptoms Are (Timing): Still Present


Location Of Pain/Discomfort: Suprapubic


Radiation Of Pain To:: None


Quality Of Discomfort: Unable To Describe


Associated Symptoms: denies: Fever, Chills, Nausea, Vomiting


Exacerbating Factors: None


Alleviating Factors: None


Recent travel outside of the [place]: No


Abnormal Vaginal Bleeding: No





Past Medical History


Reviewed: Historical Data, Nursing Documentation, Vital Signs


Vital Signs: 


 Last Vital Signs











Temp  98.3 F   07/23/17 03:15


 


Pulse  64   07/23/17 03:15


 


Resp  16   07/23/17 03:15


 


BP  95/42 L  07/23/17 03:15


 


Pulse Ox  96   07/23/17 06:44














- Medical History


PMH: Anemia, Migraine


Surgical History: Appendectomy, Cholecystectomy


Family History: States: Unknown Family Hx





- Social History


Hx Alcohol Use: No


Hx Substance Use: No





- Immunization History


Hx Tetanus Toxoid Vaccination: No


Hx Influenza Vaccination: No


Hx Pneumococcal Vaccination: No





Review Of Systems


Constitutional: Negative for: Fever, Chills


Gastrointestinal: Positive for: Abdominal Pain.  Negative for: Nausea, Vomiting





Physical Exam





- Physical Exam


Appears: Non-toxic, No Acute Distress


Skin: Normal Color, Warm, Dry


Head: Atraumatic, Normacephalic


Oral Mucosa: Moist


Chest: Symmetrical, No Tenderness


Cardiovascular: Rhythm Regular, No Murmur


Respiratory: Normal Breath Sounds, No Rales, No Rhonchi, No Wheezing


Gastrointestinal/Abdominal: Soft, Tenderness (Mild suprapubic), No Guarding, No 

Rebound


Pelvic: Normal External Exam, No Vaginal Bleeding, Vaginal Discharge ((+)thick 

white d/c), No Cervical Motion Tenderness, No Cervix Open, No Adnexal Tenderness


Neurological/Psych: Oriented x3, Normal Speech, Normal Cognition





ED Course And Treatment





- Laboratory Results


Result Diagrams: 


 07/22/17 23:26





 07/22/17 23:26


O2 Sat by Pulse Oximetry: 96 (Room air)


Pulse Ox Interpretation: Normal


Progress Note: Blood work and urinalysis ordered. IV fluids administered.





Medical Decision Making


Medical Decision Making: 


lower abd pain, consider fibroids, uti, less likely appendicitis.





noted previous 2 ed visits both with neg urine culture. plan: labs urine pain 

control, reassess. 





100: pt with persistent pain, ct with IV contrast added, as previous study was 

non contrast. labs: no leukocytosis, h/h baseline, ua, with trace luekoctyes 

and squamous cells. culture sent. additional protonix and zofran given





255: pt reassesed: states pain resolved. pt hemodynamically stable, afebrile, 

no leukocytosis. urine culture sent, trace leuks in er, with only 8wbc. uti 

less likely. chlyamidia/gc sent. pelvic shows thick white d/c, suspect candida. 

advise otc medications. 





Disposition





- Disposition


Referrals: 


Jg Mart [Staff Provider] - 


[facility] [Outside]


[facility] [Outside]


[facility] [Outside]


Disposition: HOME/ ROUTINE


Disposition Time: 02:56


Condition: STABLE


Additional Instructions: 


please follow up with your doctor/clinic. you will be notified if you culture 

results return positive. 


Prescriptions: 


Naproxen 500 mg PO BID PRN #14 tab


 PRN Reason: Pain, Mild (1-3)


Instructions:  Uterine Fibroids (ED), Acute Abdominal Pain (ED)


Print Language: Upper sorbian





- Clinical Impression


Clinical Impression: 


 Abdominal pain








- Scribe Statement


The provider has reviewed the documentation as recorded by the Scribkee Sandhu





All medical record entries made by the Rameshibkee were at my direction and 

personally dictated by me. I have reviewed the chart and agree that the record 

accurately reflects my personal performance of the history, physical exam, 

medical decision making, and the department course for this patient. I have 

also personally directed, reviewed, and agree with the discharge instructions 

and disposition.

## 2017-07-23 VITALS — SYSTOLIC BLOOD PRESSURE: 95 MMHG | HEART RATE: 64 BPM | TEMPERATURE: 98.3 F | DIASTOLIC BLOOD PRESSURE: 42 MMHG

## 2017-07-23 VITALS — OXYGEN SATURATION: 96 %

## 2017-07-23 VITALS — RESPIRATION RATE: 16 BRPM

## 2017-07-23 NOTE — CT
PROCEDURE:  CT abdomen and pelvis dated 2017. 



HISTORY:

Lower abdominal pain. Note that the scanned nurse's note indicates 

prior  x 3 and appendectomy. 



COMPARISON:

Comparison made with prior CT scan abdomen pelvis 2017



TECHNIQUE:

Contiguous axial images of the abdomen and pelvis performed following 

intravenous injection of approximately 100 cc Visipaque 320 contrast 

material.  Additional 2 dimensional sagittal and coronal reformats 

provided save. 



Radiation dose:



Total exam DLP = 374.74 mGy-cm.



This CT exam was performed using one or more of the following dose 

reduction techniques: Automated exposure control, adjustment of the 

mA and/or kV according to patient size, and/or use of iterative 

reconstruction technique.



FINDINGS:



LOWER THORAX:

The lung bases clear. No infiltrate effusion or basilar pneumothorax. 

Heart size normal. Tiny hiatal hernia 



LIVER:

The liver exhibits normal size measuring nearly 17 cm in CC 

dimension. Minor central intrahepatic biliary ductal dilatation. No 

obvious hepatic masses or collections. Portal and splenic veins are 

opacified. 



GALLBLADDER AND BILE DUCTS:

Status post cholecystectomy with metallic clips again seen in the 

gallbladder fossa. . There is persistent dilatation of the common 

bile 



PANCREAS:

No obvious pancreatic masses collections or calcifications seen.



SPLEEN:

Spleen exhibits normal size and attenuation pattern without mass 

collection or calcification. 



ADRENALS:

No adrenal lesions. . 



KIDNEYS AND URETERS:

Kidneys demonstrate symmetric nephrograms.  No evidence of 

nephrolithiasis or hydronephrosis.



BLADDER:

Urinary bladder appears incompletely distended.  No evidence of 

intraluminal urinary bladder calculi. . Suspect left ovarian cystic 

changes.  Followup ultrasound could be performed confirm. .



REPRODUCTIVE:

Re- demonstrated are what could represent anterior uterine scarring 

or possibly uterine fibroids best appreciated on sagittal on and 

coronal sequences. Prominent endometrial canal. Suspect small 

bilateral involuting ovarian cystic changes and a small amount of 

free fluid within the cul de sac..  .Pelvic ultrasound followup could 

be performed further evaluation. 



APPENDIX:

The appendix not visualized consistent with this patient's history of 

prior appendectomy.



BOWEL:

Evaluation of the bowel is limited due to the lack of oral contrast 

material.  The the stomach is distended with food debris liquid and 

air.  Visualized loops of small bowel exhibit relatively normal 

contour and caliber. No evidence of acute mechanical small bowel 

obstruction. .  The suspect fecalized content within the small bowel 

suggesting stasis. 



Moderate moderate-large amount of stool throughout the large bowel 

consistent with constipation. .  No definitive evidence of definitive 

mural wall thickening. 



PERITONEUM:

There appears to be a small amount of free fluid within the left 

aspect of the pelvis.



LYMPH NODES:

Unremarkable. No enlarged lymph nodes. 



VASCULATURE:

Unremarkable. No aortic aneurysm. 



BONES:

Minor multilevel degenerative spondylosis of the lower thoracic and 

lumbar spine.  Re- demonstrated is a small rounded lucency with thin 

sclerotic rim in the left posterior superior aspect of the L1 segment 

of uncertain etiology though unchanged from prior study.  Followup 

interval could be performed to assess stability.



OTHER FINDINGS:

None. 



IMPRESSION:

Status post cholecystectomy with dilatation of the common bile duct.  

Mild central intrahepatic biliary ductal dilatation. 



Moderate to large amount of stool seen within the colon suggesting 

fecal retention/constipation. 



Questionable anterior uterine scarring changes and/or fibroids. 

Prominent endometrial canal. Suspect small bilateral involuting 

ovarian cystic changes and a small amount of free fluid within the 

cul de sac. 



See above discussion for additional details, findings and 

recommendations.

## 2017-08-21 ENCOUNTER — HOSPITAL ENCOUNTER (EMERGENCY)
Dept: HOSPITAL 31 - C.ER | Age: 37
LOS: 1 days | Discharge: HOME | End: 2017-08-22
Payer: COMMERCIAL

## 2017-08-21 VITALS — BODY MASS INDEX: 23.6 KG/M2

## 2017-08-21 DIAGNOSIS — R07.89: ICD-10-CM

## 2017-08-21 DIAGNOSIS — R51: Primary | ICD-10-CM

## 2017-08-21 PROCEDURE — 85610 PROTHROMBIN TIME: CPT

## 2017-08-21 PROCEDURE — 71010: CPT

## 2017-08-21 PROCEDURE — 80053 COMPREHEN METABOLIC PANEL: CPT

## 2017-08-21 PROCEDURE — 82550 ASSAY OF CK (CPK): CPT

## 2017-08-21 PROCEDURE — 81001 URINALYSIS AUTO W/SCOPE: CPT

## 2017-08-21 PROCEDURE — 84484 ASSAY OF TROPONIN QUANT: CPT

## 2017-08-21 PROCEDURE — 85025 COMPLETE CBC W/AUTO DIFF WBC: CPT

## 2017-08-21 PROCEDURE — 96365 THER/PROPH/DIAG IV INF INIT: CPT

## 2017-08-21 PROCEDURE — 93005 ELECTROCARDIOGRAM TRACING: CPT

## 2017-08-21 PROCEDURE — 82553 CREATINE MB FRACTION: CPT

## 2017-08-21 PROCEDURE — 84703 CHORIONIC GONADOTROPIN ASSAY: CPT

## 2017-08-21 PROCEDURE — 70450 CT HEAD/BRAIN W/O DYE: CPT

## 2017-08-21 PROCEDURE — 85378 FIBRIN DEGRADE SEMIQUANT: CPT

## 2017-08-21 PROCEDURE — 99285 EMERGENCY DEPT VISIT HI MDM: CPT

## 2017-08-21 PROCEDURE — 85730 THROMBOPLASTIN TIME PARTIAL: CPT

## 2017-08-22 VITALS
SYSTOLIC BLOOD PRESSURE: 136 MMHG | OXYGEN SATURATION: 96 % | TEMPERATURE: 97.3 F | DIASTOLIC BLOOD PRESSURE: 74 MMHG | RESPIRATION RATE: 18 BRPM

## 2017-08-22 VITALS — HEART RATE: 74 BPM

## 2017-08-22 LAB
ALBUMIN/GLOB SERPL: 1.1 {RATIO} (ref 1–2.1)
ALP SERPL-CCNC: 67 U/L (ref 38–126)
ALT SERPL-CCNC: 27 U/L (ref 9–52)
APTT BLD: 28 SECONDS (ref 21–34)
AST SERPL-CCNC: 24 U/L (ref 14–36)
BACTERIA #/AREA URNS HPF: (no result) /[HPF]
BASOPHILS # BLD AUTO: 0.1 K/UL (ref 0–0.2)
BASOPHILS NFR BLD: 1.1 % (ref 0–2)
BILIRUB SERPL-MCNC: 1.1 MG/DL (ref 0.2–1.3)
BILIRUB UR-MCNC: NEGATIVE MG/DL
BUN SERPL-MCNC: 13 MG/DL (ref 7–17)
CALCIUM SERPL-MCNC: 9.2 MG/DL (ref 8.6–10.4)
CHLORIDE SERPL-SCNC: 103 MMOL/L (ref 98–107)
CO2 SERPL-SCNC: 24 MMOL/L (ref 22–30)
EOSINOPHIL # BLD AUTO: 0.3 K/UL (ref 0–0.7)
EOSINOPHIL NFR BLD: 4.5 % (ref 0–4)
ERYTHROCYTE [DISTWIDTH] IN BLOOD BY AUTOMATED COUNT: 16.5 % (ref 11.5–14.5)
GLOBULIN SER-MCNC: 3.5 GM/DL (ref 2.2–3.9)
GLUCOSE SERPL-MCNC: 97 MG/DL (ref 65–105)
GLUCOSE UR STRIP-MCNC: NORMAL MG/DL
HCT VFR BLD CALC: 30.1 % (ref 34–47)
INR PPP: 1.1
KETONES UR STRIP-MCNC: NEGATIVE MG/DL
LEUKOCYTE ESTERASE UR-ACNC: (no result) LEU/UL
LYMPHOCYTES # BLD AUTO: 2.6 K/UL (ref 1–4.3)
LYMPHOCYTES NFR BLD AUTO: 36.4 % (ref 20–40)
MCH RBC QN AUTO: 22.2 PG (ref 27–31)
MCHC RBC AUTO-ENTMCNC: 31.1 G/DL (ref 33–37)
MCV RBC AUTO: 71.4 FL (ref 81–99)
MONOCYTES # BLD: 0.7 K/UL (ref 0–0.8)
MONOCYTES NFR BLD: 9.4 % (ref 0–10)
NRBC BLD AUTO-RTO: 0.1 % (ref 0–2)
PH UR STRIP: 5 [PH] (ref 5–8)
PLATELET # BLD: 270 K/UL (ref 130–400)
PMV BLD AUTO: 8.6 FL (ref 7.2–11.7)
POTASSIUM SERPL-SCNC: 3.5 MMOL/L (ref 3.6–5.2)
PROT SERPL-MCNC: 7.2 G/DL (ref 6.3–8.3)
PROT UR STRIP-MCNC: NEGATIVE MG/DL
RBC # UR STRIP: NEGATIVE /UL
RBC #/AREA URNS HPF: 1 /HPF (ref 0–3)
SODIUM SERPL-SCNC: 140 MMOL/L (ref 132–148)
SP GR UR STRIP: 1.03 (ref 1–1.03)
UROBILINOGEN UR-MCNC: NORMAL MG/DL (ref 0.2–1)
WBC # BLD AUTO: 7.1 K/UL (ref 4.8–10.8)
WBC #/AREA URNS HPF: 1 /HPF (ref 0–5)

## 2017-08-22 NOTE — CARD
--------------- APPROVED REPORT --------------





EKG Measurement

Heart [id number]

WV [id number]

[id number]

[id number]

[id number]



<Conclusion>

Normal sinus rhythm

Normal ECG

## 2017-08-22 NOTE — C.PDOC
History Of Present Illness


37 year old female who presents to the ER with a complaint of a generalized 

headache, lightheadedness, dizziness, nausea, and pain to her left shoulder 

that radiates to her chest since this morning. 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 Patient has a PMHx of 

uterine fibroids and is scheduled for a hysterectomy in 1 month. Denies vomiting

, abdominal pain, or SOB.


Chief Complaint (Nursing): Chest Pain


History Per: Patient


History/Exam Limitations: no limitations


Onset/Duration Of Symptoms: Hrs


Current Symptoms Are (Timing): Still Present


Associated Symptoms: Nausea.  denies: Dyspnea, Diaphoresis, Syncope


Modifying Factors: None


Exacerbating Factors: None


Alleviating Factors: None


Recent travel outside of the [place]: No





Past Medical History


Reviewed: Historical Data, Nursing Documentation, Vital Signs


Vital Signs: 


 Last Vital Signs











Temp  97.2 F L  17 04:03


 


Pulse  74   17 04:03


 


Resp  22   17 04:03


 


BP  97/62 L  17 04:03


 


Pulse Ox  99   17 04:04














- Medical History


PMH: Anemia, Migraine


Surgical History: Appendectomy, Cholecystectomy


Family History: States: Unknown Family Hx





- Social History


Hx Alcohol Use: No


Hx Substance Use: No





- Immunization History


Hx Tetanus Toxoid Vaccination: No


Hx Influenza Vaccination: No


Hx Pneumococcal Vaccination: No





Review Of Systems


Constitutional: Negative for: Fever, Chills


Cardiovascular: Positive for: Chest Pain, Light Headedness.  Negative for: 

Palpitations


Respiratory: Negative for: Cough, Shortness of Breath


Gastrointestinal: Positive for: Nausea.  Negative for: Vomiting, Abdominal Pain


Musculoskeletal: Positive for: Shoulder Pain


Neurological: Positive for: Headache, Dizziness





Physical Exam





- Physical Exam


Appears: Non-toxic


Skin: Normal Color, Warm, Dry


Head: Atraumatic, Normacephalic


Oral Mucosa: Moist


Neck: Normal, Supple


Chest: Symmetrical, Tenderness (Left anterior)


Cardiovascular: Rhythm Regular, No Murmur


Respiratory: Normal Breath Sounds, No Rales, No Rhonchi, No Wheezing


Gastrointestinal/Abdominal: Soft, No Tenderness


Extremity: Normal ROM (x4), Tenderness (Left shoulder)


Neurological/Psych: Oriented x3, Normal Speech, Normal Cognition





ED Course And Treatment





- Laboratory Results


Result Diagrams: 


 17 02:07





 17 02:07


ECG: Interpreted By Me, Viewed By Me


ECG Rhythm: Sinus Rhythm


ECG Interpretation: Normal


Rate From EC


O2 Sat by Pulse Oximetry: 99





- Radiology


CXR: Interpreted by Me


CXR Interpretation: Yes: No Acute Disease





- CT Scan/US


  ** CT head


Other Rad Studies (CT/US): Read By Radiologist, Radiology Report Reviewed


CT/US Interpretation: EXAM:  CT Head Without Intravenous Contrast.  CLINICAL 

HISTORY:  37 years old, female; Pain; Headache; Patient HX: 17.  TECHNIQUE:

  Axial computed tomography images of the head/brain without intravenous 

contrast. All CT scans at.  this facility use one or more dose reduction 

techniques, viz.: automated exposure control; ma/kV.  adjustment per patient 

size (including targeted exams where dose is matched to indication; i.e. head);

.  or iterative reconstruction technique.  COMPARISON:  A previous report dated 

2017 is available for comparison.  FINDINGS:  Brain: Unremarkable. No 

hemorrhage. No significant white matter disease. No edema.  Ventricles: 

Unremarkable. No ventriculomegaly.  Bones/joints: Unremarkable. No acute 

fracture.  Soft tissues: Unremarkable.  Sinuses: Unremarkable as visualized. No 

acute sinusitis.  Mastoid air cells: Unremarkable as visualized. No mastoid 

effusion.  IMPRESSION:  Normal head/brain CT.  Thank you for allowing us to 

participate in the care of your patient.  Dictated and Authenticated by: Maggi Alva MD.  2017 3:54 AM Eastern Time ([place] & [place])


Progress Note: Blood work and urinalysis ordered. Reglan and IV fluids 

administered. On re-evaluation patient feels better, no neuro deficit, 

ambulating in ED, no vomiting. Patient is stable to be d/c home with PMD follow 

up.





Disposition





- Disposition


Referrals: 


Non [facility] Provider, [Primary Care Provider] - 


Disposition: HOME/ ROUTINE


Disposition Time: 04:01


Condition: STABLE


Additional Instructions: 


Follow up with PMD and OBGYN within 1-2 days. return to ED if feel worse.


Prescriptions: 


Acetaminophen/Butalbital/Caf [Fioricet] 1 tab PO TID PRN #20 tab


 PRN Reason: Headache


Ibuprofen [Motrin Tab] 600 mg PO Q8 #30 tab


Metoclopramide [Reglan] 1 tab PO TID PRN #25 tab


 PRN Reason: Nausea/Vomiting


Instructions:  Acute Headache (ED), Chest Wall Pain (ED)


Forms:  [facility] (English)





- Clinical Impression


Clinical Impression: 


 Headache, Chest wall pain








- Scribe Statement


The provider has reviewed the documentation as recorded by the Scribe





Alec Sandhu





All medical record entries made by the Scribe were at my direction and 

personally dictated by me. I have reviewed the chart and agree that the record 

accurately reflects my personal performance of the history, physical exam, 

medical decision making, and the department course for this patient. I have 

also personally directed, reviewed, and agree with the discharge instructions 

and disposition.

## 2017-08-22 NOTE — RAD
HISTORY:

left shoulder pain, radiating to chest  



COMPARISON:

No prior. 



FINDINGS:



LUNGS:

No active pulmonary disease.



PLEURA:

No significant pleural effusion identified, no pneumothorax apparent.



CARDIOVASCULAR:

Normal.



OSSEOUS STRUCTURES:

No significant abnormalities.



VISUALIZED UPPER ABDOMEN:

Normal.



OTHER FINDINGS:

None.



IMPRESSION:

No acute cardiopulmonary disease identified.

## 2017-08-22 NOTE — CT
PROCEDURE:  CT HEAD WITHOUT CONTRAST.



HISTORY:

headache



COMPARISON:

Unenhanced head CT 06/01/2017. 



TECHNIQUE:

Axial computed tomography images were obtained through the head/brain 

without intravenous contrast.  



Radiation dose:



Total exam DLP = 882 mGy-cm.



This CT exam was performed using one or more of the following dose 

reduction techniques: Automated exposure control, adjustment of the 

mA and/or kV according to patient size, and/or use of iterative 

reconstruction technique.



FINDINGS:



HEMORRHAGE:

No intracranial hemorrhage. 



BRAIN:

Gray-white matter density appears normal above and below the 

tentorium including throughout the brainstem with normal 

differentiation appreciated once again. There is no mass effect or 

parenchymal edema appreciated. No suspicious extra-axial collection 

identified.



VENTRICLES:

Unremarkable. No hydrocephalus. 



CALVARIUM:

Unremarkable.



PARANASAL SINUSES:

Unremarkable as visualized. No significant inflammatory changes.



MASTOID AIR CELLS:

Unremarkable as visualized. No inflammatory changes.



OTHER FINDINGS:

None.



IMPRESSION:

Unremarkable unenhanced head CT with no significant interval change 

compared to prior head CT dated 06/01/2017.

## 2017-09-12 ENCOUNTER — HOSPITAL ENCOUNTER (EMERGENCY)
Dept: HOSPITAL 31 - C.ER | Age: 37
LOS: 1 days | Discharge: HOME | End: 2017-09-13
Payer: COMMERCIAL

## 2017-09-12 VITALS — BODY MASS INDEX: 23.6 KG/M2

## 2017-09-12 DIAGNOSIS — R10.9: Primary | ICD-10-CM

## 2017-09-12 LAB
ALBUMIN/GLOB SERPL: 1.3 {RATIO} (ref 1–2.1)
ALP SERPL-CCNC: 53 U/L (ref 38–126)
ALT SERPL-CCNC: 31 U/L (ref 9–52)
APTT BLD: 26 SECONDS (ref 21–34)
AST SERPL-CCNC: 22 U/L (ref 14–36)
BACTERIA #/AREA URNS HPF: (no result) /[HPF]
BASOPHILS # BLD AUTO: 0.1 K/UL (ref 0–0.2)
BASOPHILS NFR BLD: 1.2 % (ref 0–2)
BILIRUB SERPL-MCNC: 1.2 MG/DL (ref 0.2–1.3)
BILIRUB UR-MCNC: NEGATIVE MG/DL
BUN SERPL-MCNC: 10 MG/DL (ref 7–17)
CALCIUM SERPL-MCNC: 9.1 MG/DL (ref 8.6–10.4)
CHLORIDE SERPL-SCNC: 105 MMOL/L (ref 98–107)
CO2 SERPL-SCNC: 23 MMOL/L (ref 22–30)
EOSINOPHIL # BLD AUTO: 0.1 K/UL (ref 0–0.7)
EOSINOPHIL NFR BLD: 1.5 % (ref 0–4)
ERYTHROCYTE [DISTWIDTH] IN BLOOD BY AUTOMATED COUNT: 17.1 % (ref 11.5–14.5)
GLOBULIN SER-MCNC: 3.2 GM/DL (ref 2.2–3.9)
GLUCOSE SERPL-MCNC: 86 MG/DL (ref 65–105)
GLUCOSE UR STRIP-MCNC: NORMAL MG/DL
HCT VFR BLD CALC: 29.6 % (ref 34–47)
INR PPP: 1.1
KETONES UR STRIP-MCNC: (no result) MG/DL
LEUKOCYTE ESTERASE UR-ACNC: (no result) LEU/UL
LYMPHOCYTES # BLD AUTO: 2.2 K/UL (ref 1–4.3)
LYMPHOCYTES NFR BLD AUTO: 34.2 % (ref 20–40)
MCH RBC QN AUTO: 22.9 PG (ref 27–31)
MCHC RBC AUTO-ENTMCNC: 31.8 G/DL (ref 33–37)
MCV RBC AUTO: 71.9 FL (ref 81–99)
MONOCYTES # BLD: 0.4 K/UL (ref 0–0.8)
MONOCYTES NFR BLD: 7 % (ref 0–10)
NRBC BLD AUTO-RTO: 0 % (ref 0–2)
PH UR STRIP: 6 [PH] (ref 5–8)
PLATELET # BLD: 273 K/UL (ref 130–400)
PMV BLD AUTO: 8.5 FL (ref 7.2–11.7)
POTASSIUM SERPL-SCNC: 3.8 MMOL/L (ref 3.6–5.2)
PROT SERPL-MCNC: 7.4 G/DL (ref 6.3–8.3)
PROT UR STRIP-MCNC: NEGATIVE MG/DL
RBC # UR STRIP: NEGATIVE /UL
SODIUM SERPL-SCNC: 139 MMOL/L (ref 132–148)
SP GR UR STRIP: 1 (ref 1–1.03)
UROBILINOGEN UR-MCNC: NORMAL MG/DL (ref 0.2–1)
WBC # BLD AUTO: 6.4 K/UL (ref 4.8–10.8)
WBC #/AREA URNS HPF: < 1 /HPF (ref 0–5)

## 2017-09-12 PROCEDURE — 99285 EMERGENCY DEPT VISIT HI MDM: CPT

## 2017-09-12 PROCEDURE — 96374 THER/PROPH/DIAG INJ IV PUSH: CPT

## 2017-09-12 PROCEDURE — 96375 TX/PRO/DX INJ NEW DRUG ADDON: CPT

## 2017-09-12 PROCEDURE — 83690 ASSAY OF LIPASE: CPT

## 2017-09-12 PROCEDURE — 76830 TRANSVAGINAL US NON-OB: CPT

## 2017-09-12 PROCEDURE — 85610 PROTHROMBIN TIME: CPT

## 2017-09-12 PROCEDURE — 85730 THROMBOPLASTIN TIME PARTIAL: CPT

## 2017-09-12 PROCEDURE — 76856 US EXAM PELVIC COMPLETE: CPT

## 2017-09-12 PROCEDURE — 85025 COMPLETE CBC W/AUTO DIFF WBC: CPT

## 2017-09-12 PROCEDURE — 80053 COMPREHEN METABOLIC PANEL: CPT

## 2017-09-12 PROCEDURE — 84703 CHORIONIC GONADOTROPIN ASSAY: CPT

## 2017-09-12 PROCEDURE — 81001 URINALYSIS AUTO W/SCOPE: CPT

## 2017-09-12 PROCEDURE — 74177 CT ABD & PELVIS W/CONTRAST: CPT

## 2017-09-12 NOTE — C.PDOC
History Of Present Illness


38 y/o female, whose PMHx includes Fibroids, presents to the ED for evaluation 

of abdominal pain which has been worsening for the past week. Patient states 

her pain radiates to her lower back and is associated with some nausea and 

vomiting. She denies fever, chills, dysuria. 


Time Seen by Provider: 09/12/17 18:49


Chief Complaint (Nursing): Abdominal Pain


History Per: Patient


History/Exam Limitations: no limitations


Onset/Duration Of Symptoms: Days (1 week )


Current Symptoms Are (Timing): Worse


Location Of Pain/Discomfort: Diffuse


Radiation Of Pain To:: Back (lower)


Quality Of Discomfort: "Pain"


Associated Symptoms: Nausea, Vomiting.  denies: Fever, Chills, Urinary Symptoms 

(dysuria )


Additional History Per: Patient





Past Medical History


Reviewed: Historical Data, Nursing Documentation, Vital Signs


Vital Signs: 


 Last Vital Signs











Temp  97.5 F L  09/13/17 02:10


 


Pulse  77   09/13/17 02:10


 


Resp  16   09/13/17 02:10


 


BP  101/66   09/13/17 02:10


 


Pulse Ox  98   09/13/17 02:10














- Medical History


PMH: Anemia, Migraine


Surgical History: Appendectomy, Cholecystectomy


Family History: States: Unknown Family Hx





- Social History


Hx Alcohol Use: No


Hx Substance Use: No





- Immunization History


Hx Tetanus Toxoid Vaccination: No


Hx Influenza Vaccination: No


Hx Pneumococcal Vaccination: No





Review Of Systems


Constitutional: Negative for: Fever, Chills


Gastrointestinal: Positive for: Nausea, Vomiting, Abdominal Pain


Genitourinary: Negative for: Dysuria


Musculoskeletal: Positive for: Back Pain (lower)





Physical Exam





- Physical Exam


Appears: Non-toxic, No Acute Distress


Skin: Normal Color, Warm, Dry


Head: Atraumatic, Normacephalic


Eye(s): bilateral: Normal Inspection


Oral Mucosa: Moist


Neck: Supple


Chest: Symmetrical, No Deformity, No Tenderness


Cardiovascular: Rhythm Regular, No Murmur


Respiratory: Normal Breath Sounds, No Rales, No Rhonchi, No Wheezing


Gastrointestinal/Abdominal: Tenderness (suprapubic ), No Guarding, No Rebound


Back: Normal Inspection


Extremity: Normal ROM, Capillary Refill (less than 2 seconds )


Neurological/Psych: Oriented x3, Normal Speech, Normal Cognition


Gait: Steady





ED Course And Treatment





- Laboratory Results


Result Diagrams: 


 09/12/17 19:10





 09/12/17 19:10


O2 Sat by Pulse Oximetry: 100 (on RA)


Pulse Ox Interpretation: Normal





Medical Decision Making


Medical Decision Making: 





Impression: 36y/o female with abdominal pain 


Plan: 


* CT A/P


* labs 


* Toradol IV 


* reassess and disposition 





Progress: 


CT A/P and labs ordered and reviewed. Patient received Toradol IV. 


23:50: Call placed to Dr. Rocha's answering service. Awaiting callback.





100: endorsed to dr cherry, jose  and final dispo





Disposition





- Disposition


Referrals: 


[facility] [employer] [Outside]


[facility] [Outside]


Miko Thornton MD [Staff Provider] - 


Disposition: HOME/ ROUTINE


Disposition Time: 01:00


Condition: GOOD


Additional Instructions: 


please follow up with your doctor/gyn. return to er with worsening symptoms or 

concern. 


Instructions:  Acute Abdominal Pain (ED)


Forms:  [facility] (English)


Print Language: Cuban





- Clinical Impression


Clinical Impression: 


 Abdominal pain








- Scribe Statement


The provider has reviewed the documentation as recorded by the Scribe (Jasmyne Vargas)


Provider Attestation: 








All medical record entries made by the Scribe were at my direction and 

personally dictated by me. I have reviewed the chart and agree that the record 

accurately reflects my personal performance of the history, physical exam, 

medical decision making, and the department course for this patient. I have 

also personally directed, reviewed, and agree with the discharge instructions 

and disposition.

## 2017-09-12 NOTE — CT
EXAM:

  CT Abdomen and Pelvis With Intravenous Contrast



CLINICAL HISTORY:

  37 years old, female; Pain; Abdominal pain; Flank; Lower; Additional info: 

Lower abd pain



TECHNIQUE:

  Axial computed tomography images of the abdomen and pelvis with intravenous 

contrast.  All CT scans at this facility use one or more dose reduction 

techniques, viz.: automated exposure control; ma/kV adjustment per patient size 

(including targeted exams where dose is matched to indication; i.e. head); or 

iterative reconstruction technique.

  Coronal and sagittal reformatted images were created and reviewed.



CONTRAST:

  100 mL of visipaque 320 administered intravenously.



COMPARISON:

  No relevant prior studies available.



FINDINGS:

  Lower thorax: No acute findings.



 ABDOMEN:

  Liver:  Unremarkable.  No mass.

  Gallbladder and bile ducts:  Cholecystectomy.  No ductal dilation.

  Pancreas:  Unremarkable.  No mass.  No ductal dilation.

  Spleen:  Unremarkable.  No splenomegaly.

  Adrenals:  Unremarkable.  No mass.

  Kidneys and ureters:  Right kidney is unremarkable.  Tiny left renal cortical 

cyst.  No hydronephrosis.

  Stomach and bowel:  Moderate to large amount retained stool.  Correlate for 

constipation.

  Appendix:  No findings to suggest acute appendicitis.



 PELVIS:

  Bladder:  Unremarkable.  No mass.

  Reproductive:  Unremarkable as visualized.



 ABDOMEN and PELVIS:

  Intraperitoneal space:  Small amount of free fluid in the pelvis.  No free 

air.

  Bones/joints:  No acute fracture.  No dislocation.

  Soft tissues:  Unremarkable.

  Vasculature:  Unremarkable.  No abdominal aortic aneurysm.

  Lymph nodes:  Unremarkable.  No enlarged lymph nodes.



IMPRESSION:     

1.  Moderate to large amount retained stool.  Correlate for constipation.

2.  Small amount of free fluid in the pelvis.

3.  Remainder of findings as above.

## 2017-09-13 VITALS
DIASTOLIC BLOOD PRESSURE: 66 MMHG | TEMPERATURE: 97.5 F | HEART RATE: 77 BPM | RESPIRATION RATE: 16 BRPM | SYSTOLIC BLOOD PRESSURE: 101 MMHG

## 2017-09-13 NOTE — US
EXAM:

  US Pelvis Complete, Transabdominal



CLINICAL HISTORY:

  37 years old, female; Pain; Pelvic pain; Prior surgery; Surgery date: 6+ 

months; Surgery type: 3 c-sections



TECHNIQUE:

  Real-time transabdominal pelvic ultrasound (complete) with image 

documentation.



COMPARISON:

  No relevant prior studies available.



FINDINGS:

  Uterus/cervix:  Uterus measures 12.0 x 4.8 x 5.9 CM.  Endometrial stripe 

measures 1.2 CM.  No myometrial mass.

  Right ovary:  Right ovary measures 3.3 x 1.6 x 3.3 CM.  Right ovary 

demonstrates normal waveforms.  Normal blood flow.

  Left ovary:  Left ovary measures 2.0 x 1.6 x 1.8 CM.  Left ovary demonstrates 

grossly normal waveforms.  Normal blood flow.

  Free fluid: Trace free fluid, better seen transvaginally.

  Bladder:  Unremarkable as visualized.  Wall is normal thickness for degree of 

distention.



IMPRESSION:     

  No acute findings.



_______________________________________________



EXAM:

  US Pelvis, Transvaginal



CLINICAL HISTORY:

  37 years old, female; Pain; Pelvic pain; Prior surgery; Surgery date: 6+ 

months; Surgery type: 3 c-sections



TECHNIQUE:

  Real-time transvaginal pelvic ultrasound (complete) with image documentation. 

 Transvaginal imaging was used for better evaluation of the endometrium and 

adnexa.



COMPARISON:

  CT - ABD   PELVIS IV CONTRAST ONLY 9/12/2017 10:45:02 PM



FINDINGS:

  Uterus/cervix:  Transvaginally the endometrial stripe measures 1.7 CM.  A 

fibroid in the anterior mid uterus measures 2.7 x 2.5 x 2.7 CM.  A second 

fibroid in the posterior mid uterus measures 1.7 x 2.0 x 1.7 CM.

  Right ovary:  Right ovary demonstrates a complex cyst measuring 2.5 x 1.9 x 

2.5 CM.  If indicated, this can be followed up in 6-8 weeks.  Right ovary 

transvaginally measures 3.8 x 2.7 CM.  Right ovary demonstrates normal 

waveforms.  Normal blood flow.

  Left ovary:  Trace free fluid is noted at the left adnexa and posterior 

cul-de-sac.  Left ovary is not visualized transvaginally.

  Free fluid:  See above.

  Bladder:  Empty bladder which cannot be evaluated with this probe.



IMPRESSION:     

1.  Right ovary demonstrates a complex cyst measuring 2.5 x 1.9 x 2.5 CM.  If 

indicated, this can be followed up in 6-8 weeks.

2.  Trace free fluid is noted at the left adnexa and posterior cul-de-sac.

3.  2 fibroids as described.

4.  No acute findings.

## 2017-09-15 VITALS — OXYGEN SATURATION: 100 %

## 2017-10-23 ENCOUNTER — HOSPITAL ENCOUNTER (EMERGENCY)
Dept: HOSPITAL 14 - H.ER | Age: 37
Discharge: HOME | End: 2017-10-23
Payer: COMMERCIAL

## 2017-10-23 VITALS
OXYGEN SATURATION: 100 % | HEART RATE: 81 BPM | SYSTOLIC BLOOD PRESSURE: 132 MMHG | TEMPERATURE: 97.9 F | DIASTOLIC BLOOD PRESSURE: 82 MMHG | RESPIRATION RATE: 16 BRPM

## 2017-10-23 VITALS — BODY MASS INDEX: 22.6 KG/M2

## 2017-10-23 DIAGNOSIS — F43.20: Primary | ICD-10-CM

## 2017-10-23 DIAGNOSIS — F41.0: ICD-10-CM

## 2017-10-23 LAB
BASOPHILS # BLD AUTO: 0.1 K/UL (ref 0–0.2)
BASOPHILS NFR BLD: 0.9 % (ref 0–2)
BUN SERPL-MCNC: 6 MG/DL (ref 7–17)
CALCIUM SERPL-MCNC: 9.4 MG/DL (ref 8.4–10.2)
CHLORIDE SERPL-SCNC: 107 MMOL/L (ref 98–107)
CO2 SERPL-SCNC: 25 MMOL/L (ref 22–30)
EOSINOPHIL # BLD AUTO: 0.1 K/UL (ref 0–0.7)
EOSINOPHIL NFR BLD: 0.9 % (ref 0–4)
ERYTHROCYTE [DISTWIDTH] IN BLOOD BY AUTOMATED COUNT: 16.9 % (ref 11.5–14.5)
ETHANOL SERPL-MCNC: < 10 MG/DL (ref 0–10)
GLUCOSE SERPL-MCNC: 93 MG/DL (ref 65–105)
HCT VFR BLD CALC: 29.3 % (ref 34–47)
LYMPHOCYTES # BLD AUTO: 1.3 K/UL (ref 1–4.3)
LYMPHOCYTES NFR BLD AUTO: 21 % (ref 20–40)
MCH RBC QN AUTO: 22.1 PG (ref 27–31)
MCHC RBC AUTO-ENTMCNC: 30.8 G/DL (ref 33–37)
MCV RBC AUTO: 71.5 FL (ref 81–99)
MONOCYTES # BLD: 0.4 K/UL (ref 0–0.8)
MONOCYTES NFR BLD: 6.6 % (ref 0–10)
NEUTROPHILS # BLD: 4.3 K/UL (ref 1.8–7)
NEUTROPHILS NFR BLD AUTO: 70.6 % (ref 50–75)
NRBC BLD AUTO-RTO: 0 % (ref 0–0)
PLATELET # BLD: 305 K/UL (ref 130–400)
PMV BLD AUTO: 8.4 FL (ref 7.2–11.7)
POTASSIUM SERPL-SCNC: 3.8 MMOL/L (ref 3.6–5)
SODIUM SERPL-SCNC: 141 MMOL/L (ref 132–148)
WBC # BLD AUTO: 6 K/UL (ref 4.8–10.8)

## 2017-10-23 NOTE — ED PDOC
HPI: Psych/Substance Abuse


Time Seen by Provider: 10/23/17 17:14


Chief Complaint (Nursing): Psychiatric Evaluation


Chief Complaint (Provider): Psychiatric Evaluation


History Per: Patient


History/Exam Limitations: clinical condition


Current Symptoms Are (Timing): Still Present


Additional History Per: Boyfriend


Additional Complaint(s): 





Patient is a 38 y/o female who was brought to the ED by her  after she 

became unresponsive following a verbal argument in the car. She was upset and 

became unresponsive to verbal stimuli. In the ED she was noted to be crying and 

hyperventilating.  states that he was breaking up with her, and that 

she is currently going through a divorce.





PMD: None Provided





Past Medical History


Reviewed: Historical Data, Nursing Documentation, Vital Signs


Vital Signs: 





 Last Vital Signs











Temp  97.9 F   10/23/17 17:03


 


Pulse  81   10/23/17 17:03


 


Resp  16   10/23/17 17:03


 


BP  132/82   10/23/17 17:03


 


Pulse Ox  100   10/23/17 17:03














- Medical History


PMH: Anemia, Migraine





- Surgical History


Surgical History: Appendectomy, Cholecystectomy





- Family History


Family History: States: Unknown Family Hx





- Immunization History


Hx Tetanus Toxoid Vaccination: No


Hx Influenza Vaccination: No


Hx Pneumococcal Vaccination: No





- Home Medications


Home Medications: 


 Ambulatory Orders











 Medication  Instructions  Recorded


 


Acetaminophen/Butalbital/Caf 1 tab PO TID PRN #20 tab 08/22/17





[Fioricet]  


 


Ibuprofen [Motrin Tab] 600 mg PO Q8 #30 tab 08/22/17


 


Metoclopramide [Reglan] 1 tab PO TID PRN #25 tab 08/22/17














- Allergies


Allergies/Adverse Reactions: 


 Allergies











Allergy/AdvReac Type Severity Reaction Status Date / Time


 


No Known Allergies Allergy   Unverified 08/22/17 00:02














Review of Systems


Review Of Systems: ROS cannot be obtained secondary to pt's inabilty to answer 

questions.





Physical Exam





- Reviewed


Nursing Documentation Reviewed: Yes


Vital Signs Reviewed: Yes





- Physical Exam


Appears: Positive for: Non-toxic, No Acute Distress


Head Exam: Positive for: ATRAUMATIC, NORMAL INSPECTION, NORMOCEPHALIC


Skin: Positive for: Normal Color, Warm, Dry


Eye Exam: Positive for: Normal appearance, EOMI, PERRL.  Negative for: Nystagmus


ENT: Positive for: Normal ENT Inspection


Neck: Positive for: Normal, Painless ROM, Supple


Cardiovascular/Chest: Positive for: Regular Rate, Rhythm.  Negative for: Edema, 

Murmur


Respiratory: Positive for: Normal Breath Sounds.  Negative for: Wheezing, 

Respiratory Distress


Gastrointestinal/Abdominal: Positive for: Normal Exam, Bowel Sounds, Soft.  

Negative for: Tenderness


Back: Positive for: Normal Inspection


Extremity: Positive for: Normal ROM.  Negative for: Pedal Edema, Deformity


Neurologic/Psych: Positive for: Alert (arousable to verbal stimuli. not awake 

or alert), Oriented, Mood/Affect (appears anxious)





- Laboratory Results


Result Diagrams: 


 10/23/17 18:00





 10/23/17 18:00





- ECG


O2 Sat by Pulse Oximetry: 100 (RA)


Pulse Ox Interpretation: Normal





Medical Decision Making


Medical Decision Making: 





Time: 17:37


Initial Impression: adjustment disorder, panic attack


Initial Plan:


--EKG


--Alcohol Serum


--BMP


--Urine Drug Screen


--HCG


--Urine Pregnancy


--CBC





Patient is cleared by Dr Shaikh per crisis. Patient is upset but back to 

normal mental status at this time. 





--------------------------------------------------------------------------------

---------------


Scribe Attestation:


Documented by Loy Almaraz, acting as a scribe for Dr. Flores Montanez MD.





Provider Scribe Attestation:


All medical record entries made by the Scribe were at my direction and 

personally dictated by me. I have reviewed the chart and agree that the record 

accurately reflects my personal performance of the history, physical exam, 

medical decision making, and the department course for this patient. I have 

also personally directed, reviewed, and agree with the discharge instructions 

and disposition.





Disposition





- Clinical Impression


Clinical Impression: 


 Adjustment disorder








- Patient ED Disposition


Is Patient to be Admitted: No


Doctor Will See Patient In The: Office


Counseled Patient/Family Regarding: Studies Performed, Diagnosis, Need For 

Followup





- Disposition


Referrals: 


[facility] [Outside]


Disposition: Routine/Home


Disposition Time: 19:00


Condition: GOOD


Additional Instructions: 


Follow up with your PCP in 2-3 days. 


Instructions:  Stress (ED), Mood Disorders (ED)

## 2018-03-24 ENCOUNTER — HOSPITAL ENCOUNTER (EMERGENCY)
Dept: HOSPITAL 31 - C.ER | Age: 38
Discharge: HOME | End: 2018-03-24
Payer: SELF-PAY

## 2018-03-24 VITALS — RESPIRATION RATE: 18 BRPM

## 2018-03-24 VITALS — OXYGEN SATURATION: 100 %

## 2018-03-24 VITALS — SYSTOLIC BLOOD PRESSURE: 102 MMHG | DIASTOLIC BLOOD PRESSURE: 65 MMHG | HEART RATE: 78 BPM | TEMPERATURE: 97.6 F

## 2018-03-24 VITALS — BODY MASS INDEX: 24.3 KG/M2

## 2018-03-24 DIAGNOSIS — G43.909: Primary | ICD-10-CM

## 2018-03-24 DIAGNOSIS — F32.9: ICD-10-CM

## 2018-03-24 LAB
ALBUMIN SERPL-MCNC: 4.3 G/DL (ref 3.5–5)
ALBUMIN/GLOB SERPL: 1 {RATIO} (ref 1–2.1)
ALT SERPL-CCNC: 73 U/L (ref 9–52)
APAP SERPL-MCNC: < 10 UG/ML (ref 10–30)
APTT BLD: 28 SECONDS (ref 21–34)
AST SERPL-CCNC: 61 U/L (ref 14–36)
BACTERIA #/AREA URNS HPF: (no result) /[HPF]
BASOPHILS # BLD AUTO: 0.2 K/UL (ref 0–0.2)
BASOPHILS NFR BLD: 2.1 % (ref 0–2)
BILIRUB UR-MCNC: NEGATIVE MG/DL
BUN SERPL-MCNC: 9 MG/DL (ref 7–17)
CALCIUM SERPL-MCNC: 9.3 MG/DL (ref 8.6–10.4)
EOSINOPHIL # BLD AUTO: 0.2 K/UL (ref 0–0.7)
EOSINOPHIL NFR BLD: 1.5 % (ref 0–4)
ERYTHROCYTE [DISTWIDTH] IN BLOOD BY AUTOMATED COUNT: 18.6 % (ref 11.5–14.5)
GFR NON-AFRICAN AMERICAN: > 60
GLUCOSE UR STRIP-MCNC: NORMAL MG/DL
HCG,QUALITATIVE URINE: NEGATIVE
HGB BLD-MCNC: 9.1 G/DL (ref 11–16)
INR PPP: 1
LEUKOCYTE ESTERASE UR-ACNC: (no result) LEU/UL
LYMPHOCYTES # BLD AUTO: 2.2 K/UL (ref 1–4.3)
LYMPHOCYTES NFR BLD AUTO: 20.9 % (ref 20–40)
MCH RBC QN AUTO: 20.5 PG (ref 27–31)
MCHC RBC AUTO-ENTMCNC: 31.1 G/DL (ref 33–37)
MCV RBC AUTO: 66.1 FL (ref 81–99)
MONOCYTES # BLD: 0.6 K/UL (ref 0–0.8)
MONOCYTES NFR BLD: 5.4 % (ref 0–10)
NEUTROPHILS # BLD: 7.3 K/UL (ref 1.8–7)
NEUTROPHILS NFR BLD AUTO: 70.1 % (ref 50–75)
NRBC BLD AUTO-RTO: 0.1 % (ref 0–2)
PH UR STRIP: 6 [PH] (ref 5–8)
PLATELET # BLD: 391 K/UL (ref 130–400)
PMV BLD AUTO: 8.3 FL (ref 7.2–11.7)
PROT UR STRIP-MCNC: NEGATIVE MG/DL
PROTHROMBIN TIME: 11.1 SECONDS (ref 9.7–12.2)
RBC # BLD AUTO: 4.44 MIL/UL (ref 3.8–5.2)
RBC # UR STRIP: NEGATIVE /UL
SALICYLATE: < 1 MG/DL 1
SP GR UR STRIP: 1.01 (ref 1–1.03)
SQUAMOUS EPITHIAL: 1 /HPF (ref 0–5)
UROBILINOGEN UR-MCNC: NORMAL MG/DL (ref 0.2–1)
WBC # BLD AUTO: 10.4 K/UL (ref 4.8–10.8)

## 2018-03-24 PROCEDURE — 96375 TX/PRO/DX INJ NEW DRUG ADDON: CPT

## 2018-03-24 PROCEDURE — 80053 COMPREHEN METABOLIC PANEL: CPT

## 2018-03-24 PROCEDURE — 96374 THER/PROPH/DIAG INJ IV PUSH: CPT

## 2018-03-24 PROCEDURE — 85610 PROTHROMBIN TIME: CPT

## 2018-03-24 PROCEDURE — 85730 THROMBOPLASTIN TIME PARTIAL: CPT

## 2018-03-24 PROCEDURE — 84703 CHORIONIC GONADOTROPIN ASSAY: CPT

## 2018-03-24 PROCEDURE — 81001 URINALYSIS AUTO W/SCOPE: CPT

## 2018-03-24 PROCEDURE — 96361 HYDRATE IV INFUSION ADD-ON: CPT

## 2018-03-24 PROCEDURE — 99285 EMERGENCY DEPT VISIT HI MDM: CPT

## 2018-03-24 PROCEDURE — 85025 COMPLETE CBC W/AUTO DIFF WBC: CPT

## 2018-03-24 NOTE — C.PDOC
History Of Present Illness


37 year old female, whose PMHx includes Migraines, presents to the ED for 

evaluation of a migraine headache which began earlier today. Patient states her 

symptoms are similar to prior headaches. She also reports mnild dysuria. 

Patient took Ibuprofen and 2 vicodin at home without relief. She denies fever/

chills, cough, runny nose, vomiting, diarrhea, vaginal bleeding/discharge.   

She also admits to feeling depressed recently, however denies SI/HI.


Time Seen by Provider: 03/24/18 07:03


Chief Complaint (Nursing): Psychiatric Evaluation


History Per: Patient


History/Exam Limitations: no limitations


Onset/Duration Of Symptoms: Hrs


Current Symptoms Are (Timing): Still Present


Severity: Moderate


Additional History Per: Patient





Past Medical History


Reviewed: Historical Data, Nursing Documentation, Vital Signs


Vital Signs: 


 Last Vital Signs











Temp  97.6 F   03/24/18 08:58


 


Pulse  78   03/24/18 08:58


 


Resp  18   03/24/18 08:58


 


BP  102/65   03/24/18 08:58


 


Pulse Ox  99   03/24/18 08:58














- Medical History


PMH: Anemia, Migraine


Surgical History: Appendectomy, Cholecystectomy


Family History: States: No Known Family Hx





- Social History


Hx Alcohol Use: No


Hx Substance Use: No





- Immunization History


Hx Tetanus Toxoid Vaccination: No


Hx Influenza Vaccination: No


Hx Pneumococcal Vaccination: No





Review Of Systems


Except As Marked, All Systems Reviewed And Found Negative.


Constitutional: Negative for: Fever, Chills


ENT: Negative for: Nose Discharge


Cardiovascular: Negative for: Chest Pain, Palpitations


Respiratory: Negative for: Cough, Shortness of Breath


Gastrointestinal: Negative for: Nausea, Vomiting, Abdominal Pain, Diarrhea


Genitourinary: Positive for: Dysuria.  Negative for: Hematuria, Vaginal 

Discharge, Vaginal Bleeding


Neurological: Positive for: Headache





Physical Exam





- Physical Exam


Appears: Well, Non-toxic, Other (uncomfortable appearing, (+) Photophobia)


Skin: Normal Color, Warm, Dry


Head: Atraumatic, Normacephalic


Eye(s): bilateral: Normal Inspection


Oral Mucosa: Moist


Neck: Supple, Other (no meningismus)


Cardiovascular: Rhythm Regular


Respiratory: Normal Breath Sounds, No Rales, No Rhonchi, No Wheezing


Gastrointestinal/Abdominal: Normal Exam, Bowel Sounds, Soft, No Tenderness


Neurological/Psych: Oriented x3, Normal Speech, Normal Cognition


Gait: Steady





ED Course And Treatment





- Laboratory Results


Result Diagrams: 


 03/24/18 06:29





 03/24/18 06:29


O2 Sat by Pulse Oximetry: 100 (on RA)


Pulse Ox Interpretation: Normal


Progress Note: Bloodwork and UA, Upreg ordered and reviewed.  Patient given IV 

NS bolus, IV Reglan, IV Benadryl.  Patient seen by crisis counselor, who 

discussed patient with on call psyciatrist.  Patient has been scheduled for [facility] 

appt on 3/22, and is cleared for discharge from psychiatric standpoint.


Reevaluation Time: 09:20


Reassessment Condition: Improved (Patient reassessed, is resting comfortably 

and states her headache has resolved.  Patient is well appearing, comfortable 

being discharged, and has normal vitals.  She was given Rx for Fiorecet, and 

instructed to follow up with [facility] on 3/22 as scheduled.  Patient understands she 

should return to ED if she has any concerning symptoms.)





Disposition


Counseled Patient/Family Regarding: Studies Performed, Diagnosis, Need For 

Followup, Rx Given





- Disposition


Referrals: 


[facility] [Outside]


[facility] [Outside]


Disposition: HOME/ ROUTINE


Disposition Time: 09:20


Condition: STABLE


Prescriptions: 


Acetaminophen/Butalbital/Caf [Fioricet] 1 tab PO TID PRN #20 tab


 PRN Reason: Headache


Instructions:  Depression, Migraine Headaches in Adults


Forms:  [facility] (English)


Print Language: Argentine





- Clinical Impression


Clinical Impression: 


 Migraine, Depression








- Scribe Statement


The provider has reviewed the documentation as recorded by the Scribe (Jasmyne Vargas)


Provider Attestation: 








All medical record entries made by the Scribe were at my direction and 

personally dictated by me. I have reviewed the chart and agree that the record 

accurately reflects my personal performance of the history, physical exam, 

medical decision making, and the department course for this patient. I have 

also personally directed, reviewed, and agree with the discharge instructions 

and disposition.

## 2018-03-28 ENCOUNTER — HOSPITAL ENCOUNTER (INPATIENT)
Dept: HOSPITAL 31 - C.ER | Age: 38
LOS: 1 days | Discharge: HOME | DRG: 395 | End: 2018-03-29
Attending: INTERNAL MEDICINE | Admitting: INTERNAL MEDICINE
Payer: COMMERCIAL

## 2018-03-28 VITALS — BODY MASS INDEX: 24.3 KG/M2

## 2018-03-28 DIAGNOSIS — D50.9: Primary | ICD-10-CM

## 2018-03-28 DIAGNOSIS — G43.909: ICD-10-CM

## 2018-03-28 DIAGNOSIS — K59.00: ICD-10-CM

## 2018-03-28 DIAGNOSIS — D25.9: ICD-10-CM

## 2018-03-28 DIAGNOSIS — R53.1: ICD-10-CM

## 2018-03-28 DIAGNOSIS — K29.70: ICD-10-CM

## 2018-03-28 DIAGNOSIS — R55: ICD-10-CM

## 2018-03-28 DIAGNOSIS — I95.9: ICD-10-CM

## 2018-03-28 DIAGNOSIS — F32.9: ICD-10-CM

## 2018-03-28 DIAGNOSIS — Z79.1: ICD-10-CM

## 2018-03-28 LAB
% IRON SATURATION: 2.2 (ref 20–55)
ALBUMIN SERPL-MCNC: 4 G/DL (ref 3.5–5)
ALBUMIN/GLOB SERPL: 1.2 {RATIO} (ref 1–2.1)
ALT SERPL-CCNC: 40 U/L (ref 9–52)
APTT BLD: 25 SECONDS (ref 21–34)
AST SERPL-CCNC: 26 U/L (ref 14–36)
BASOPHILS # BLD AUTO: 0.1 K/UL (ref 0–0.2)
BASOPHILS # BLD AUTO: 0.1 K/UL (ref 0–0.2)
BASOPHILS NFR BLD: 1.2 % (ref 0–2)
BASOPHILS NFR BLD: 1.3 % (ref 0–2)
BILIRUB UR-MCNC: NEGATIVE MG/DL
BUN SERPL-MCNC: 9 MG/DL (ref 7–17)
CALCIUM SERPL-MCNC: 8.9 MG/DL (ref 8.6–10.4)
EOSINOPHIL # BLD AUTO: 0 K/UL (ref 0–0.7)
EOSINOPHIL # BLD AUTO: 0 K/UL (ref 0–0.7)
EOSINOPHIL NFR BLD: 0.2 % (ref 0–4)
EOSINOPHIL NFR BLD: 0.5 % (ref 0–4)
ERYTHROCYTE [DISTWIDTH] IN BLOOD BY AUTOMATED COUNT: 18.5 % (ref 11.5–14.5)
ERYTHROCYTE [DISTWIDTH] IN BLOOD BY AUTOMATED COUNT: 18.8 % (ref 11.5–14.5)
FERRITIN SERPL-MCNC: 6 NG/ML
FOLATE SERPL-MCNC: 16.8 NG/ML
GFR NON-AFRICAN AMERICAN: > 60
GLUCOSE UR STRIP-MCNC: NORMAL MG/DL
HGB BLD-MCNC: 6.8 G/DL (ref 11–16)
HGB BLD-MCNC: 7.7 G/DL (ref 11–16)
INR PPP: 1.1
IRON SERPL-MCNC: < 10 UG/DL (ref 37–170)
LEUKOCYTE ESTERASE UR-ACNC: (no result) LEU/UL
LIPASE: 101 U/L (ref 23–300)
LYMPHOCYTES # BLD AUTO: 1.1 K/UL (ref 1–4.3)
LYMPHOCYTES # BLD AUTO: 2.6 K/UL (ref 1–4.3)
LYMPHOCYTES NFR BLD AUTO: 21.6 % (ref 20–40)
LYMPHOCYTES NFR BLD AUTO: 39.8 % (ref 20–40)
MCH RBC QN AUTO: 20.8 PG (ref 27–31)
MCH RBC QN AUTO: 21.1 PG (ref 27–31)
MCHC RBC AUTO-ENTMCNC: 31.9 G/DL (ref 33–37)
MCHC RBC AUTO-ENTMCNC: 32.4 G/DL (ref 33–37)
MCV RBC AUTO: 65.2 FL (ref 81–99)
MCV RBC AUTO: 65.3 FL (ref 81–99)
MONOCYTES # BLD: 0.3 K/UL (ref 0–0.8)
MONOCYTES # BLD: 0.3 K/UL (ref 0–0.8)
MONOCYTES NFR BLD: 5.2 % (ref 0–10)
MONOCYTES NFR BLD: 5.4 % (ref 0–10)
NEUTROPHILS # BLD: 3.5 K/UL (ref 1.8–7)
NEUTROPHILS # BLD: 3.6 K/UL (ref 1.8–7)
NEUTROPHILS NFR BLD AUTO: 53.3 % (ref 50–75)
NEUTROPHILS NFR BLD AUTO: 71.5 % (ref 50–75)
NRBC BLD AUTO-RTO: 0 % (ref 0–2)
NRBC BLD AUTO-RTO: 0 % (ref 0–2)
PH UR STRIP: 5 [PH] (ref 5–8)
PLATELET # BLD: 321 K/UL (ref 130–400)
PLATELET # BLD: 373 K/UL (ref 130–400)
PMV BLD AUTO: 7.9 FL (ref 7.2–11.7)
PMV BLD AUTO: 8.2 FL (ref 7.2–11.7)
PROT UR STRIP-MCNC: NEGATIVE MG/DL
PROTHROMBIN TIME: 13 SECONDS (ref 9.7–12.2)
RBC # BLD AUTO: 3.27 MIL/UL (ref 3.8–5.2)
RBC # BLD AUTO: 3.66 MIL/UL (ref 3.8–5.2)
RBC # UR STRIP: (no result) /UL
SP GR UR STRIP: 1.04 (ref 1–1.03)
SQUAMOUS EPITHIAL: 2 /HPF (ref 0–5)
TIBC SERPL-MCNC: 454 UG/DL (ref 250–450)
UROBILINOGEN UR-MCNC: NORMAL MG/DL (ref 0.2–1)
VIT B12 SERPL-MCNC: 393 PG/ML (ref 239–931)
WBC # BLD AUTO: 5 K/UL (ref 4.8–10.8)
WBC # BLD AUTO: 6.6 K/UL (ref 4.8–10.8)

## 2018-03-28 PROCEDURE — 30233N1 TRANSFUSION OF NONAUTOLOGOUS RED BLOOD CELLS INTO PERIPHERAL VEIN, PERCUTANEOUS APPROACH: ICD-10-PCS | Performed by: INTERNAL MEDICINE

## 2018-03-28 RX ADMIN — MAGNESIUM SULFATE IN DEXTROSE SCH MLS/HR: 10 INJECTION, SOLUTION INTRAVENOUS at 13:57

## 2018-03-28 RX ADMIN — MAGNESIUM SULFATE IN DEXTROSE SCH MLS/HR: 10 INJECTION, SOLUTION INTRAVENOUS at 15:05

## 2018-03-28 NOTE — CP.PCM.CON
<Troy Vargas - Last Filed: 18 18:24>





History of Present Illness





- History of Present Illness


History of Present Illness: 


PGY4 Initial GI consult 





Brendan Waldron is a 37 year old  female with Hx of anemia, uterine 

fibroids, and migraines who presents to ER with weakness and epigastric abd 

pain. she was found to have a Hgb of 7.7. During ultrasound, patient became 

unresponsive and rapid response was called. Patient's vitals remained stable 

and she was transferred to ER. She was awake after painful stimuli.  Patient 

denies BRBPR, melena, hematemsis or coffee-ground emesis. Patient denies 

vomiting any blood or change of pain level with food.  She notes that he abd 

pain started 2 days prior and radiated towards her umbilicus. Stool occult 

blood was positive in ER. She states she is currently on her period. Her 

periods occur regularly monthly with heavy flow. She notes taking ibuprofen and 

vicodin for migraine pain frequently for migraines.





PMHx: fibroids, migraine, depression, chronic anemia (2/2 to fibroids?)


PSHx: 4  sections, appendectomy and as per prior charts cholecystectomy 


FamilyHx: denies


SocialHx: Patient denies smoking, drinking or recreational drug use. 


Endo hx: denies 





ROS: 12 point ROS is conducted and neg other than above





Past Patient History





- Infectious Disease


Hx of Infectious Diseases: None





- Past Social History


Smoking Status: Never Smoked





- CARDIAC


Hx Hypertension: No


Hx Hypotension: Yes





- PULMONARY


Hx Tuberculosis: No





- NEUROLOGICAL


Hx Migraine: Yes


Hx Seizures: No





- HEENT


Hx HEENT Problems: No





- RENAL


Hx Chronic Kidney Disease: No





- ENDOCRINE/METABOLIC


Other/Comment: "low blood sugar"





- HEMATOLOGICAL/ONCOLOGICAL


Hx Anemia: Yes


Hx Human Immunodeficiency Virus (HIV): No





- INTEGUMENTARY


Hx Dermatological Problems: No





- MUSCULOSKELETAL/RHEUMATOLOGICAL


Hx Falls: No





- GASTROINTESTINAL


Hx Gastrointestinal Disorders: No





- GENITOURINARY/GYNECOLOGICAL


Hx Sexually Transmitted Disorders: No





- PSYCHIATRIC


Hx Depression: Yes


Hx Substance Use: No





- SURGICAL HISTORY


Hx Appendectomy: Yes


Hx Cholecystectomy: Yes





- ANESTHESIA


Hx Anesthesia: Yes


Hx Anesthesia Reactions: Yes (pt states she got too much anesthesia 11 years ago

, had trouble waking up)





Meds


Allergies/Adverse Reactions: 


 Allergies











Allergy/AdvReac Type Severity Reaction Status Date / Time


 


No Known Allergies Allergy   Verified 18 04:09














- Medications


Medications: 


 Current Medications





Pantoprazole Sodium (Protonix Inj)  40 mg IVP Q12H [facility]


   Last Admin: 18 11:57 Dose:  40 mg











Physical Exam





- Constitutional


Appears: No Acute Distress





- Head Exam


Head Exam: ATRAUMATIC, NORMOCEPHALIC





- Eye Exam


Eye Exam: Normal appearance


Pupil Exam: NORMAL ACCOMODATION





- ENT Exam


ENT Exam: Mucous Membranes Moist, Normal Exam





- Neck Exam


Neck exam: Positive for: Normal Inspection





- Respiratory Exam


Respiratory Exam: Clear to Auscultation Bilateral, NORMAL BREATHING PATTERN.  

absent: Rales, Rhonchi, Wheezes, Respiratory Distress





- Cardiovascular Exam


Cardiovascular Exam: REGULAR RHYTHM, +S1, +S2





- GI/Abdominal Exam


GI & Abdominal Exam: Normal Bowel Sounds, Soft.  absent: Guarding, Hernia, Rigid

, Tenderness





- Rectal Exam


Rectal Exam: absent: Black Stool, Bloody Stool





- Extremities Exam


Extremities exam: Negative for: joint swelling, pedal edema





- Neurological Exam


Neurological exam: Alert, Oriented x3





- Psychiatric Exam


Psychiatric exam: Normal Affect, Normal Mood





- Skin


Skin Exam: Dry, Intact, Normal Color, Warm





Results





- Vital Signs


Recent Vital Signs: 


 Last Vital Signs











Temp  98.1 F   18 17:30


 


Pulse  73   18 17:30


 


Resp  20   18 17:30


 


BP  102/68   18 17:30


 


Pulse Ox  100   18 15:00














- Labs


Result Diagrams: 


 18 06:56





 18 04:43


Labs: 


 Laboratory Results - last 24 hr











  18





  04:18 04:43 04:43


 


WBC   6.6 


 


RBC   3.66 L 


 


Hgb   7.7 L 


 


Hct   23.9 L 


 


MCV   65.2 L 


 


MCH   21.1 L 


 


MCHC   32.4 L 


 


RDW   18.5 H 


 


Plt Count   373 


 


MPV   8.2 


 


Neut % (Auto)   53.3 


 


Lymph % (Auto)   39.8 


 


Mono % (Auto)   5.2 


 


Eos % (Auto)   0.5 


 


Baso % (Auto)   1.2 


 


Neut # (Auto)   3.5 


 


Lymph # (Auto)   2.6 


 


Mono # (Auto)   0.3 


 


Eos # (Auto)   0.0 


 


Baso # (Auto)   0.1 


 


Retic Count   


 


Haptoglobin   


 


PT   


 


INR   


 


APTT   


 


Sodium    136


 


Potassium    2.9 L


 


Chloride    100


 


Carbon Dioxide    21 L


 


Anion Gap    18


 


BUN    9


 


Creatinine    0.5 L


 


Est GFR ( Amer)    > 60


 


Est GFR (Non-Af Amer)    > 60


 


POC Glucose (mg/dL)  136 H  


 


Random Glucose    156 H


 


Calcium    8.9


 


Phosphorus   


 


Magnesium   


 


Iron   


 


TIBC   


 


% Saturation   


 


Ferritin   


 


Total Bilirubin    0.8


 


AST    26


 


ALT    40


 


Alkaline Phosphatase    68


 


Total Protein    7.4


 


Albumin    4.0


 


Globulin    3.4


 


Albumin/Globulin Ratio    1.2


 


Lipase    101


 


Vitamin B12   


 


Folate   


 


Urine Color   


 


Urine Clarity   


 


Urine pH   


 


Ur Specific Gravity   


 


Urine Protein   


 


Urine Glucose (UA)   


 


Urine Ketones   


 


Urine Blood   


 


Urine Nitrate   


 


Urine Bilirubin   


 


Urine Urobilinogen   


 


Ur Leukocyte Esterase   


 


Urine WBC (Auto)   


 


Urine RBC (Auto)   


 


Ur Squamous Epith Cells   


 


Urine HCG, Qual   


 


Stool Occult Blood   


 


Urine Opiates Screen   


 


Urine Methadone Screen   


 


Ur Barbiturates Screen   


 


Ur Phencyclidine Scrn   


 


Ur Amphetamines Screen   


 


U Benzodiazepines Scrn   


 


U Oth Cocaine Metabols   


 


U Cannabinoids Screen   


 


Alcohol, Quantitative    < 10


 


Blood Type   


 


Blood Type Confirm   


 


Antibody Screen   














  18





  06:27 06:27 06:27


 


WBC   


 


RBC   


 


Hgb   


 


Hct   


 


MCV   


 


MCH   


 


MCHC   


 


RDW   


 


Plt Count   


 


MPV   


 


Neut % (Auto)   


 


Lymph % (Auto)   


 


Mono % (Auto)   


 


Eos % (Auto)   


 


Baso % (Auto)   


 


Neut # (Auto)   


 


Lymph # (Auto)   


 


Mono # (Auto)   


 


Eos # (Auto)   


 


Baso # (Auto)   


 


Retic Count   


 


Haptoglobin   


 


PT   


 


INR   


 


APTT   


 


Sodium   


 


Potassium   


 


Chloride   


 


Carbon Dioxide   


 


Anion Gap   


 


BUN   


 


Creatinine   


 


Est GFR ( Amer)   


 


Est GFR (Non-Af Amer)   


 


POC Glucose (mg/dL)   


 


Random Glucose   


 


Calcium   


 


Phosphorus   


 


Magnesium   


 


Iron   


 


TIBC   


 


% Saturation   


 


Ferritin   


 


Total Bilirubin   


 


AST   


 


ALT   


 


Alkaline Phosphatase   


 


Total Protein   


 


Albumin   


 


Globulin   


 


Albumin/Globulin Ratio   


 


Lipase   


 


Vitamin B12   


 


Folate   


 


Urine Color   Yellow 


 


Urine Clarity   Clear 


 


Urine pH   5.0 


 


Ur Specific Gravity   1.043 H 


 


Urine Protein   Negative 


 


Urine Glucose (UA)   Normal 


 


Urine Ketones   1+ H 


 


Urine Blood   1+ H 


 


Urine Nitrate   Negative 


 


Urine Bilirubin   Negative 


 


Urine Urobilinogen   Normal 


 


Ur Leukocyte Esterase   Neg 


 


Urine WBC (Auto)   1 


 


Urine RBC (Auto)   13 H 


 


Ur Squamous Epith Cells   2 


 


Urine HCG, Qual    Negative


 


Stool Occult Blood   


 


Urine Opiates Screen  Negative  


 


Urine Methadone Screen  Negative  


 


Ur Barbiturates Screen  Negative  


 


Ur Phencyclidine Scrn  Negative  


 


Ur Amphetamines Screen  Negative  


 


U Benzodiazepines Scrn  Negative  


 


U Oth Cocaine Metabols  Negative  


 


U Cannabinoids Screen  Negative  


 


Alcohol, Quantitative   


 


Blood Type   


 


Blood Type Confirm   


 


Antibody Screen   














  18





  06:56 06:56 08:38


 


WBC  5.0  


 


RBC  3.27 L  


 


Hgb  6.8 L  


 


Hct  21.3 L  


 


MCV  65.3 L  


 


MCH  20.8 L  


 


MCHC  31.9 L  


 


RDW  18.8 H  


 


Plt Count  321  


 


MPV  7.9  


 


Neut % (Auto)  71.5  


 


Lymph % (Auto)  21.6  


 


Mono % (Auto)  5.4  


 


Eos % (Auto)  0.2  


 


Baso % (Auto)  1.3  


 


Neut # (Auto)  3.6  


 


Lymph # (Auto)  1.1  


 


Mono # (Auto)  0.3  


 


Eos # (Auto)  0.0  


 


Baso # (Auto)  0.1  


 


Retic Count  1.0  


 


Haptoglobin   


 


PT    13.0 H


 


INR    1.1


 


APTT    25


 


Sodium   


 


Potassium   


 


Chloride   


 


Carbon Dioxide   


 


Anion Gap   


 


BUN   


 


Creatinine   


 


Est GFR ( Amer)   


 


Est GFR (Non-Af Amer)   


 


POC Glucose (mg/dL)   


 


Random Glucose   


 


Calcium   


 


Phosphorus   


 


Magnesium   


 


Iron   


 


TIBC   


 


% Saturation   


 


Ferritin   


 


Total Bilirubin   


 


AST   


 


ALT   


 


Alkaline Phosphatase   


 


Total Protein   


 


Albumin   


 


Globulin   


 


Albumin/Globulin Ratio   


 


Lipase   


 


Vitamin B12   


 


Folate   


 


Urine Color   


 


Urine Clarity   


 


Urine pH   


 


Ur Specific Gravity   


 


Urine Protein   


 


Urine Glucose (UA)   


 


Urine Ketones   


 


Urine Blood   


 


Urine Nitrate   


 


Urine Bilirubin   


 


Urine Urobilinogen   


 


Ur Leukocyte Esterase   


 


Urine WBC (Auto)   


 


Urine RBC (Auto)   


 


Ur Squamous Epith Cells   


 


Urine HCG, Qual   


 


Stool Occult Blood   Positive H 


 


Urine Opiates Screen   


 


Urine Methadone Screen   


 


Ur Barbiturates Screen   


 


Ur Phencyclidine Scrn   


 


Ur Amphetamines Screen   


 


U Benzodiazepines Scrn   


 


U Oth Cocaine Metabols   


 


U Cannabinoids Screen   


 


Alcohol, Quantitative   


 


Blood Type   


 


Blood Type Confirm   


 


Antibody Screen   














  18





  08:38 08:38 09:08


 


WBC   


 


RBC   


 


Hgb   


 


Hct   


 


MCV   


 


MCH   


 


MCHC   


 


RDW   


 


Plt Count   


 


MPV   


 


Neut % (Auto)   


 


Lymph % (Auto)   


 


Mono % (Auto)   


 


Eos % (Auto)   


 


Baso % (Auto)   


 


Neut # (Auto)   


 


Lymph # (Auto)   


 


Mono # (Auto)   


 


Eos # (Auto)   


 


Baso # (Auto)   


 


Retic Count   


 


Haptoglobin   


 


PT   


 


INR   


 


APTT   


 


Sodium   


 


Potassium   


 


Chloride   


 


Carbon Dioxide   


 


Anion Gap   


 


BUN   


 


Creatinine   


 


Est GFR ( Amer)   


 


Est GFR (Non-Af Amer)   


 


POC Glucose (mg/dL)   


 


Random Glucose   


 


Calcium   


 


Phosphorus  3.4  


 


Magnesium  1.5 L  


 


Iron    < 10 L


 


TIBC    454 H


 


% Saturation    2.20 L


 


Ferritin  6.0  


 


Total Bilirubin   


 


AST   


 


ALT   


 


Alkaline Phosphatase   


 


Total Protein   


 


Albumin   


 


Globulin   


 


Albumin/Globulin Ratio   


 


Lipase   


 


Vitamin B12  393  


 


Folate  16.8  


 


Urine Color   


 


Urine Clarity   


 


Urine pH   


 


Ur Specific Gravity   


 


Urine Protein   


 


Urine Glucose (UA)   


 


Urine Ketones   


 


Urine Blood   


 


Urine Nitrate   


 


Urine Bilirubin   


 


Urine Urobilinogen   


 


Ur Leukocyte Esterase   


 


Urine WBC (Auto)   


 


Urine RBC (Auto)   


 


Ur Squamous Epith Cells   


 


Urine HCG, Qual   


 


Stool Occult Blood   


 


Urine Opiates Screen   


 


Urine Methadone Screen   


 


Ur Barbiturates Screen   


 


Ur Phencyclidine Scrn   


 


Ur Amphetamines Screen   


 


U Benzodiazepines Scrn   


 


U Oth Cocaine Metabols   


 


U Cannabinoids Screen   


 


Alcohol, Quantitative   


 


Blood Type   O POSITIVE 


 


Blood Type Confirm   O POSITIVE 


 


Antibody Screen   Negative 














  18





  09:08


 


WBC 


 


RBC 


 


Hgb 


 


Hct 


 


MCV 


 


MCH 


 


MCHC 


 


RDW 


 


Plt Count 


 


MPV 


 


Neut % (Auto) 


 


Lymph % (Auto) 


 


Mono % (Auto) 


 


Eos % (Auto) 


 


Baso % (Auto) 


 


Neut # (Auto) 


 


Lymph # (Auto) 


 


Mono # (Auto) 


 


Eos # (Auto) 


 


Baso # (Auto) 


 


Retic Count 


 


Haptoglobin  103.3


 


PT 


 


INR 


 


APTT 


 


Sodium 


 


Potassium 


 


Chloride 


 


Carbon Dioxide 


 


Anion Gap 


 


BUN 


 


Creatinine 


 


Est GFR ( Amer) 


 


Est GFR (Non-Af Amer) 


 


POC Glucose (mg/dL) 


 


Random Glucose 


 


Calcium 


 


Phosphorus 


 


Magnesium 


 


Iron 


 


TIBC 


 


% Saturation 


 


Ferritin 


 


Total Bilirubin 


 


AST 


 


ALT 


 


Alkaline Phosphatase 


 


Total Protein 


 


Albumin 


 


Globulin 


 


Albumin/Globulin Ratio 


 


Lipase 


 


Vitamin B12 


 


Folate 


 


Urine Color 


 


Urine Clarity 


 


Urine pH 


 


Ur Specific Gravity 


 


Urine Protein 


 


Urine Glucose (UA) 


 


Urine Ketones 


 


Urine Blood 


 


Urine Nitrate 


 


Urine Bilirubin 


 


Urine Urobilinogen 


 


Ur Leukocyte Esterase 


 


Urine WBC (Auto) 


 


Urine RBC (Auto) 


 


Ur Squamous Epith Cells 


 


Urine HCG, Qual 


 


Stool Occult Blood 


 


Urine Opiates Screen 


 


Urine Methadone Screen 


 


Ur Barbiturates Screen 


 


Ur Phencyclidine Scrn 


 


Ur Amphetamines Screen 


 


U Benzodiazepines Scrn 


 


U Oth Cocaine Metabols 


 


U Cannabinoids Screen 


 


Alcohol, Quantitative 


 


Blood Type 


 


Blood Type Confirm 


 


Antibody Screen 














Assessment & Plan





- Assessment and Plan (Free Text)


Assessment: 


Ruchi Waldron is a 37 F w/ hx of fibroidsm, anemia, and migraines who presents 

with anemia, lethargy, and weakness 





Anemia, unknown etiology DDx: Uterine, PUD, AVM


Chronic NSIAD use


hx of migraines 








Plan: 


-transfuse to maintain a hgb >7 


-maintain 2 large IV bore lines


-start PPI 40mg PID


-NPO after midnight 


-EGD tomorrow


-avoid NSAIDs


-h/h q 12 hrs 


-rest of care as per primary team





D/W Dr. Werner








<AlphonsoJung - Last Filed: 18 19:01>





Meds





- Medications


Medications: 


 Current Medications





Pantoprazole Sodium (Protonix Inj)  40 mg IVP Q12H [facility]


   Last Admin: 18 11:57 Dose:  40 mg











Results





- Vital Signs


Recent Vital Signs: 


 Last Vital Signs











Temp  98.1 F   18 17:30


 


Pulse  73   18 17:30


 


Resp  20   18 17:30


 


BP  102/68   18 17:30


 


Pulse Ox  100   18 15:00














- Labs


Result Diagrams: 


 18 06:56





 18 04:43


Labs: 


 Laboratory Results - last 24 hr











  18





  04:18 04:43 04:43


 


WBC   6.6 


 


RBC   3.66 L 


 


Hgb   7.7 L 


 


Hct   23.9 L 


 


MCV   65.2 L 


 


MCH   21.1 L 


 


MCHC   32.4 L 


 


RDW   18.5 H 


 


Plt Count   373 


 


MPV   8.2 


 


Neut % (Auto)   53.3 


 


Lymph % (Auto)   39.8 


 


Mono % (Auto)   5.2 


 


Eos % (Auto)   0.5 


 


Baso % (Auto)   1.2 


 


Neut # (Auto)   3.5 


 


Lymph # (Auto)   2.6 


 


Mono # (Auto)   0.3 


 


Eos # (Auto)   0.0 


 


Baso # (Auto)   0.1 


 


Retic Count   


 


Haptoglobin   


 


PT   


 


INR   


 


APTT   


 


Sodium    136


 


Potassium    2.9 L


 


Chloride    100


 


Carbon Dioxide    21 L


 


Anion Gap    18


 


BUN    9


 


Creatinine    0.5 L


 


Est GFR ( Amer)    > 60


 


Est GFR (Non-Af Amer)    > 60


 


POC Glucose (mg/dL)  136 H  


 


Random Glucose    156 H


 


Calcium    8.9


 


Phosphorus   


 


Magnesium   


 


Iron   


 


TIBC   


 


% Saturation   


 


Ferritin   


 


Total Bilirubin    0.8


 


AST    26


 


ALT    40


 


Alkaline Phosphatase    68


 


Total Protein    7.4


 


Albumin    4.0


 


Globulin    3.4


 


Albumin/Globulin Ratio    1.2


 


Lipase    101


 


Vitamin B12   


 


Folate   


 


Urine Color   


 


Urine Clarity   


 


Urine pH   


 


Ur Specific Gravity   


 


Urine Protein   


 


Urine Glucose (UA)   


 


Urine Ketones   


 


Urine Blood   


 


Urine Nitrate   


 


Urine Bilirubin   


 


Urine Urobilinogen   


 


Ur Leukocyte Esterase   


 


Urine WBC (Auto)   


 


Urine RBC (Auto)   


 


Ur Squamous Epith Cells   


 


Urine HCG, Qual   


 


Stool Occult Blood   


 


Urine Opiates Screen   


 


Urine Methadone Screen   


 


Ur Barbiturates Screen   


 


Ur Phencyclidine Scrn   


 


Ur Amphetamines Screen   


 


U Benzodiazepines Scrn   


 


U Oth Cocaine Metabols   


 


U Cannabinoids Screen   


 


Alcohol, Quantitative    < 10


 


Blood Type   


 


Blood Type Confirm   


 


Antibody Screen   














  18





  06:27 06:27 06:27


 


WBC   


 


RBC   


 


Hgb   


 


Hct   


 


MCV   


 


MCH   


 


MCHC   


 


RDW   


 


Plt Count   


 


MPV   


 


Neut % (Auto)   


 


Lymph % (Auto)   


 


Mono % (Auto)   


 


Eos % (Auto)   


 


Baso % (Auto)   


 


Neut # (Auto)   


 


Lymph # (Auto)   


 


Mono # (Auto)   


 


Eos # (Auto)   


 


Baso # (Auto)   


 


Retic Count   


 


Haptoglobin   


 


PT   


 


INR   


 


APTT   


 


Sodium   


 


Potassium   


 


Chloride   


 


Carbon Dioxide   


 


Anion Gap   


 


BUN   


 


Creatinine   


 


Est GFR ( Amer)   


 


Est GFR (Non-Af Amer)   


 


POC Glucose (mg/dL)   


 


Random Glucose   


 


Calcium   


 


Phosphorus   


 


Magnesium   


 


Iron   


 


TIBC   


 


% Saturation   


 


Ferritin   


 


Total Bilirubin   


 


AST   


 


ALT   


 


Alkaline Phosphatase   


 


Total Protein   


 


Albumin   


 


Globulin   


 


Albumin/Globulin Ratio   


 


Lipase   


 


Vitamin B12   


 


Folate   


 


Urine Color   Yellow 


 


Urine Clarity   Clear 


 


Urine pH   5.0 


 


Ur Specific Gravity   1.043 H 


 


Urine Protein   Negative 


 


Urine Glucose (UA)   Normal 


 


Urine Ketones   1+ H 


 


Urine Blood   1+ H 


 


Urine Nitrate   Negative 


 


Urine Bilirubin   Negative 


 


Urine Urobilinogen   Normal 


 


Ur Leukocyte Esterase   Neg 


 


Urine WBC (Auto)   1 


 


Urine RBC (Auto)   13 H 


 


Ur Squamous Epith Cells   2 


 


Urine HCG, Qual    Negative


 


Stool Occult Blood   


 


Urine Opiates Screen  Negative  


 


Urine Methadone Screen  Negative  


 


Ur Barbiturates Screen  Negative  


 


Ur Phencyclidine Scrn  Negative  


 


Ur Amphetamines Screen  Negative  


 


U Benzodiazepines Scrn  Negative  


 


U Oth Cocaine Metabols  Negative  


 


U Cannabinoids Screen  Negative  


 


Alcohol, Quantitative   


 


Blood Type   


 


Blood Type Confirm   


 


Antibody Screen   














  18





  06:56 06:56 08:38


 


WBC  5.0  


 


RBC  3.27 L  


 


Hgb  6.8 L  


 


Hct  21.3 L  


 


MCV  65.3 L  


 


MCH  20.8 L  


 


MCHC  31.9 L  


 


RDW  18.8 H  


 


Plt Count  321  


 


MPV  7.9  


 


Neut % (Auto)  71.5  


 


Lymph % (Auto)  21.6  


 


Mono % (Auto)  5.4  


 


Eos % (Auto)  0.2  


 


Baso % (Auto)  1.3  


 


Neut # (Auto)  3.6  


 


Lymph # (Auto)  1.1  


 


Mono # (Auto)  0.3  


 


Eos # (Auto)  0.0  


 


Baso # (Auto)  0.1  


 


Retic Count  1.0  


 


Haptoglobin   


 


PT    13.0 H


 


INR    1.1


 


APTT    25


 


Sodium   


 


Potassium   


 


Chloride   


 


Carbon Dioxide   


 


Anion Gap   


 


BUN   


 


Creatinine   


 


Est GFR ( Amer)   


 


Est GFR (Non-Af Amer)   


 


POC Glucose (mg/dL)   


 


Random Glucose   


 


Calcium   


 


Phosphorus   


 


Magnesium   


 


Iron   


 


TIBC   


 


% Saturation   


 


Ferritin   


 


Total Bilirubin   


 


AST   


 


ALT   


 


Alkaline Phosphatase   


 


Total Protein   


 


Albumin   


 


Globulin   


 


Albumin/Globulin Ratio   


 


Lipase   


 


Vitamin B12   


 


Folate   


 


Urine Color   


 


Urine Clarity   


 


Urine pH   


 


Ur Specific Gravity   


 


Urine Protein   


 


Urine Glucose (UA)   


 


Urine Ketones   


 


Urine Blood   


 


Urine Nitrate   


 


Urine Bilirubin   


 


Urine Urobilinogen   


 


Ur Leukocyte Esterase   


 


Urine WBC (Auto)   


 


Urine RBC (Auto)   


 


Ur Squamous Epith Cells   


 


Urine HCG, Qual   


 


Stool Occult Blood   Positive H 


 


Urine Opiates Screen   


 


Urine Methadone Screen   


 


Ur Barbiturates Screen   


 


Ur Phencyclidine Scrn   


 


Ur Amphetamines Screen   


 


U Benzodiazepines Scrn   


 


U Oth Cocaine Metabols   


 


U Cannabinoids Screen   


 


Alcohol, Quantitative   


 


Blood Type   


 


Blood Type Confirm   


 


Antibody Screen   














  18





  08:38 08:38 09:08


 


WBC   


 


RBC   


 


Hgb   


 


Hct   


 


MCV   


 


MCH   


 


MCHC   


 


RDW   


 


Plt Count   


 


MPV   


 


Neut % (Auto)   


 


Lymph % (Auto)   


 


Mono % (Auto)   


 


Eos % (Auto)   


 


Baso % (Auto)   


 


Neut # (Auto)   


 


Lymph # (Auto)   


 


Mono # (Auto)   


 


Eos # (Auto)   


 


Baso # (Auto)   


 


Retic Count   


 


Haptoglobin   


 


PT   


 


INR   


 


APTT   


 


Sodium   


 


Potassium   


 


Chloride   


 


Carbon Dioxide   


 


Anion Gap   


 


BUN   


 


Creatinine   


 


Est GFR ( Amer)   


 


Est GFR (Non-Af Amer)   


 


POC Glucose (mg/dL)   


 


Random Glucose   


 


Calcium   


 


Phosphorus  3.4  


 


Magnesium  1.5 L  


 


Iron    < 10 L


 


TIBC    454 H


 


% Saturation    2.20 L


 


Ferritin  6.0  


 


Total Bilirubin   


 


AST   


 


ALT   


 


Alkaline Phosphatase   


 


Total Protein   


 


Albumin   


 


Globulin   


 


Albumin/Globulin Ratio   


 


Lipase   


 


Vitamin B12  393  


 


Folate  16.8  


 


Urine Color   


 


Urine Clarity   


 


Urine pH   


 


Ur Specific Gravity   


 


Urine Protein   


 


Urine Glucose (UA)   


 


Urine Ketones   


 


Urine Blood   


 


Urine Nitrate   


 


Urine Bilirubin   


 


Urine Urobilinogen   


 


Ur Leukocyte Esterase   


 


Urine WBC (Auto)   


 


Urine RBC (Auto)   


 


Ur Squamous Epith Cells   


 


Urine HCG, Qual   


 


Stool Occult Blood   


 


Urine Opiates Screen   


 


Urine Methadone Screen   


 


Ur Barbiturates Screen   


 


Ur Phencyclidine Scrn   


 


Ur Amphetamines Screen   


 


U Benzodiazepines Scrn   


 


U Oth Cocaine Metabols   


 


U Cannabinoids Screen   


 


Alcohol, Quantitative   


 


Blood Type   O POSITIVE 


 


Blood Type Confirm   O POSITIVE 


 


Antibody Screen   Negative 














  18





  09:08


 


WBC 


 


RBC 


 


Hgb 


 


Hct 


 


MCV 


 


MCH 


 


MCHC 


 


RDW 


 


Plt Count 


 


MPV 


 


Neut % (Auto) 


 


Lymph % (Auto) 


 


Mono % (Auto) 


 


Eos % (Auto) 


 


Baso % (Auto) 


 


Neut # (Auto) 


 


Lymph # (Auto) 


 


Mono # (Auto) 


 


Eos # (Auto) 


 


Baso # (Auto) 


 


Retic Count 


 


Haptoglobin  103.3


 


PT 


 


INR 


 


APTT 


 


Sodium 


 


Potassium 


 


Chloride 


 


Carbon Dioxide 


 


Anion Gap 


 


BUN 


 


Creatinine 


 


Est GFR ( Amer) 


 


Est GFR (Non-Af Amer) 


 


POC Glucose (mg/dL) 


 


Random Glucose 


 


Calcium 


 


Phosphorus 


 


Magnesium 


 


Iron 


 


TIBC 


 


% Saturation 


 


Ferritin 


 


Total Bilirubin 


 


AST 


 


ALT 


 


Alkaline Phosphatase 


 


Total Protein 


 


Albumin 


 


Globulin 


 


Albumin/Globulin Ratio 


 


Lipase 


 


Vitamin B12 


 


Folate 


 


Urine Color 


 


Urine Clarity 


 


Urine pH 


 


Ur Specific Gravity 


 


Urine Protein 


 


Urine Glucose (UA) 


 


Urine Ketones 


 


Urine Blood 


 


Urine Nitrate 


 


Urine Bilirubin 


 


Urine Urobilinogen 


 


Ur Leukocyte Esterase 


 


Urine WBC (Auto) 


 


Urine RBC (Auto) 


 


Ur Squamous Epith Cells 


 


Urine HCG, Qual 


 


Stool Occult Blood 


 


Urine Opiates Screen 


 


Urine Methadone Screen 


 


Ur Barbiturates Screen 


 


Ur Phencyclidine Scrn 


 


Ur Amphetamines Screen 


 


U Benzodiazepines Scrn 


 


U Oth Cocaine Metabols 


 


U Cannabinoids Screen 


 


Alcohol, Quantitative 


 


Blood Type 


 


Blood Type Confirm 


 


Antibody Screen 














Attending/Attestation





- Attestation


I have personally seen and examined this patient.: Yes


I have fully participated in the care of the patient.: Yes


I have reviewed all pertinent clinical information: Yes


Notes (Text): 





18 19:00


37 year old female who presents with symptomatic anemia in the setting of 

chronic nsaid use. Recommend egd tomorrow to eval for PUD. PPI therapy. NPO 

after mn. Transfuse 2 units of blood.

## 2018-03-28 NOTE — C.PDOC
History Of Present Illness





Pt presents to ER with c/o of epigastric pain, nausea, vomiting, generalized 

weakness, dizziness PTA. As per neighbor present in ED with pt, she was called 

by pt to bring her to ER for weakness. Friend states pt appeared drowsy and 

weak. Pt is poor historian and not answeing further questions


Time Seen by Provider: 03/28/18 04:15


Chief Complaint (Nursing): Abdominal Pain


History Per: Patient


History/Exam Limitations: no limitations, other (sleepy but arousable)


Current Symptoms Are (Timing): Gone


Severity: Moderate


Last Bowel Movement: Today





Past Medical History


Vital Signs: 


 Last Vital Signs











Temp  98.4 F   03/28/18 06:26


 


Pulse  67   03/28/18 06:26


 


Resp  17   03/28/18 06:26


 


BP  98/60 L  03/28/18 06:26


 


Pulse Ox  100   03/28/18 06:26














- Medical History


PMH: Anemia, Depression, Migraine


   Denies: Diabetes, Hepatitis, HIV, HTN, Seizures, Sexually Transmitted Disease


Surgical History: Appendectomy, Cholecystectomy


Family History: States: Unknown Family Hx





- Social History


Hx Alcohol Use: No


Hx Substance Use: No





- Immunization History


Hx Tetanus Toxoid Vaccination: No


Hx Influenza Vaccination: No


Hx Pneumococcal Vaccination: No





Review Of Systems


Constitutional: Positive for: Weakness, Malaise.  Negative for: Fever, Chills


Cardiovascular: Negative for: Chest Pain


Respiratory: Negative for: Shortness of Breath


Gastrointestinal: Positive for: Nausea, Vomiting, Abdominal Pain (epigastric)


Neurological: Positive for: Weakness





Physical Exam





- Physical Exam


Appears: Well, Non-toxic


Skin: Normal Color, No Jaundice


Head: Atraumatic


Eye(s): bilateral: Normal Inspection, PERRL, EOMI, Other (no icterus)


Neck: Normal, Supple


Chest: Symmetrical


Cardiovascular: Rhythm Regular


Respiratory: Normal Breath Sounds, No Wheezing


Gastrointestinal/Abdominal: Normal Exam, Soft, Tenderness (tenderness to 

epigastric area), No Distention, No Guarding


Rectal: Deferred


Back: Normal Inspection, No CVA Tenderness


Pelvic: Vaginal Bleeding (mild), No Other (no clots)


Extremity: Left: Other


Neurological/Psych: Oriented x3


Gait: Unable To Assess





ED Course And Treatment





- Laboratory Results


Result Diagrams: 


 03/28/18 06:56





 03/28/18 04:43


Lab Interpretation: Abnormal


Interpretation Of Abnormal: Cbc 7.1 decr from 9.1 on 3/24.  K 2.9


O2 Sat by Pulse Oximetry: 100


Pulse Ox Interpretation: Normal


Progress Note: Pt is pale, nauseous, voomiting, hypotensive HR normal.  Case d/

w dr Vargas OB on call who advised repeat CBC and Pelvis US





Disposition





- Disposition


Disposition Time: 07:26


Condition: FAIR


Forms:  [facility] (English)





- Clinical Impression


Clinical Impression: 


 Symptomatic anemia, Vaginal bleeding








Physician Patient Turnover


Patient Signed Over To: Angela Souza


Handoff Comments: Pending labs, US and admission for symptomatic anemia

## 2018-03-28 NOTE — CP.PCM.HP
<Dimitrios Plaza CAROL - Last Filed: 18 09:21>





History of Present Illness





- History of Present Illness


History of Present Illness: 


CC: Symptomatic Anemia





HPI: 37 year old  female with PMHx of uterine fibroids, anemia, migraine

, depression, presents to ER with weakness, epigastric pain, nausea, and 

vomiting. In the ER, patient was pale, lethargic, and had a measured Hgb of 

7.7. ER attending discussed case with OB attending who advised patient be sent 

to have pelvic ultrasound. In ultrasound room patient became unresponsive and 

rapid response was called. Patient's responded to sternal rub but remained 

lethargic. Patient's vitals remained stable and she was transferred to ER.  

Patient interview was conducted via  (hospital ). 

Patient was lethargic and possibly poor historian. Patient states she takes 

tylenol and ibuprofen for pain occasionally. Patient denies any mary blood in 

stool and states her stools are yellow colored. Patient denies vomiting any 

blood or change of pain level with food.  Patient states she has pain in her 

stomach region that has been present for the past 2 days. Stool occult blood 

was positive in ER. She states she is currently on her period. Her periods 

occur regularly monthly with heavy flow. She states she uses one "big pad" per 

day of her period.   





Patient was previously in ER on 3/24/18 for migraines. She took ibuprofen and 

vicodin for migraine pain. On that visit her Hgb was 9.1 which it has been 

trending down from a peak of 9.8 on 17. Prior chart review also states 

patient was to have a hysterectomy with OBGYN Dr. Andrew Rocha.





PMHx: fibroids, migraine, depression, chronic anemia (2/2 to fibroids?)


PSHx: 4  sections, appendectomy and as per prior charts cholecystectomy 


Home meds: Motrin as needed


Allergies: denies


FamilyHx: unknown


SocialHx: Patient denies smoking, drinking or recreational drug use. Patient 

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 Chart review shows patient is 

recently . 








Present on Admission





- Present on Admission


Any Indicators Present on Admission: No





Review of Systems





- Review of Systems


Systems not reviewed;Unavailable: Altered Mental Status





- Constitutional


Constitutional: Fatigue, Lethargy, Malaise.  absent: Chills, Fever





- Cardiovascular


Cardiovascular: Lightheadedness.  absent: Chest Pain, Chest Pain at Rest





- Respiratory


Respiratory: absent: Cough, Hemoptysis, Wheezing





- Gastrointestinal


Gastrointestinal: Abdominal Pain, Nausea, Vomiting.  absent: Bloating, 

Constipation, Cramping, Diarrhea, Heartburn, Hematemesis, Hematochezia, Melena





- Genitourinary


Genitourinary: absent: Dysuria





- Neurological


Neurological: Dizziness





Past Patient History





- Infectious Disease


Hx of Infectious Diseases: None





- Past Social History


Smoking Status: Never Smoked





- CARDIAC


Hx Hypertension: No





- PULMONARY


Hx Tuberculosis: No





- NEUROLOGICAL


Hx Migraine: Yes


Hx Seizures: No





- ENDOCRINE/METABOLIC


Other/Comment: "low blood sugar"





- HEMATOLOGICAL/ONCOLOGICAL


Hx Anemia: Yes


Hx Human Immunodeficiency Virus (HIV): No





- GENITOURINARY/GYNECOLOGICAL


Hx Sexually Transmitted Disorders: No





- PSYCHIATRIC


Hx Depression: Yes


Hx Substance Use: No





- SURGICAL HISTORY


Hx Appendectomy: Yes


Hx Cholecystectomy: Yes





- ANESTHESIA


Hx Anesthesia: Yes


Hx Anesthesia Reactions: No





Meds


Allergies/Adverse Reactions: 


 Allergies











Allergy/AdvReac Type Severity Reaction Status Date / Time


 


No Known Allergies Allergy   Verified 18 04:09














Physical Exam





- Constitutional


Additional comments: 


Lethargic








- Head Exam


Head Exam: ATRAUMATIC, NORMAL INSPECTION





- Eye Exam


Eye Exam: EOMI


Pupil Exam: PERRL





- ENT Exam


ENT Exam: Mucous Membranes Dry





- Neck Exam


Neck exam: Positive for: Normal Inspection.  Negative for: Lymphadenopathy, 

Tenderness





- Respiratory Exam


Respiratory Exam: Clear to Auscultation Bilateral, NORMAL BREATHING PATTERN.  

absent: Rales, Rhonchi, Wheezes





- Cardiovascular Exam


Cardiovascular Exam: REGULAR RHYTHM, +S1, +S2.  absent: Bradycardia, Tachycardia

, JVD, Systolic Murmur





- GI/Abdominal Exam


GI & Abdominal Exam: Normal Bowel Sounds, Soft, Tenderness.  absent: Distended, 

Firm, Guarding, Hernia, Mass, Rebound, Rigid


Additional comments: 


Tenderness to palpation in epigastric region








- Rectal Exam


Rectal Exam: Deferred





- Extremities Exam


Extremities exam: Positive for: full ROM, normal capillary refill, normal 

inspection, pedal pulses present.  Negative for: calf tenderness, tenderness





- Neurological Exam


Neurological exam: Alert, Oriented x3





- Skin


Skin Exam: Intact, Pallor, Warm





Results





- Vital Signs


Recent Vital Signs: 





 Last Vital Signs











Temp  97 F L  18 08:00


 


Pulse  64   18 08:31


 


Resp  14   18 08:31


 


BP  92/53 L  18 08:31


 


Pulse Ox  100   18 08:31














- Labs


Result Diagrams: 


 18 06:56





 18 04:43


Labs: 





 Laboratory Results - last 24 hr











  18





  04:18 04:43 04:43


 


WBC   6.6 


 


RBC   3.66 L 


 


Hgb   7.7 L 


 


Hct   23.9 L 


 


MCV   65.2 L 


 


MCH   21.1 L 


 


MCHC   32.4 L 


 


RDW   18.5 H 


 


Plt Count   373 


 


MPV   8.2 


 


Neut % (Auto)   53.3 


 


Lymph % (Auto)   39.8 


 


Mono % (Auto)   5.2 


 


Eos % (Auto)   0.5 


 


Baso % (Auto)   1.2 


 


Neut # (Auto)   3.5 


 


Lymph # (Auto)   2.6 


 


Mono # (Auto)   0.3 


 


Eos # (Auto)   0.0 


 


Baso # (Auto)   0.1 


 


PT   


 


INR   


 


APTT   


 


Sodium    136


 


Potassium    2.9 L


 


Chloride    100


 


Carbon Dioxide    21 L


 


Anion Gap    18


 


BUN    9


 


Creatinine    0.5 L


 


Est GFR ( Amer)    > 60


 


Est GFR (Non-Af Amer)    > 60


 


POC Glucose (mg/dL)  136 H  


 


Random Glucose    156 H


 


Calcium    8.9


 


Phosphorus   


 


Magnesium   


 


Total Bilirubin    0.8


 


AST    26


 


ALT    40


 


Alkaline Phosphatase    68


 


Total Protein    7.4


 


Albumin    4.0


 


Globulin    3.4


 


Albumin/Globulin Ratio    1.2


 


Lipase    101


 


Urine Color   


 


Urine Clarity   


 


Urine pH   


 


Ur Specific Gravity   


 


Urine Protein   


 


Urine Glucose (UA)   


 


Urine Ketones   


 


Urine Blood   


 


Urine Nitrate   


 


Urine Bilirubin   


 


Urine Urobilinogen   


 


Ur Leukocyte Esterase   


 


Urine WBC (Auto)   


 


Urine RBC (Auto)   


 


Ur Squamous Epith Cells   


 


Urine HCG, Qual   


 


Stool Occult Blood   


 


Urine Opiates Screen   


 


Urine Methadone Screen   


 


Ur Barbiturates Screen   


 


Ur Phencyclidine Scrn   


 


Ur Amphetamines Screen   


 


U Benzodiazepines Scrn   


 


U Oth Cocaine Metabols   


 


U Cannabinoids Screen   


 


Alcohol, Quantitative    < 10














  18





  06:27 06:27 06:27


 


WBC   


 


RBC   


 


Hgb   


 


Hct   


 


MCV   


 


MCH   


 


MCHC   


 


RDW   


 


Plt Count   


 


MPV   


 


Neut % (Auto)   


 


Lymph % (Auto)   


 


Mono % (Auto)   


 


Eos % (Auto)   


 


Baso % (Auto)   


 


Neut # (Auto)   


 


Lymph # (Auto)   


 


Mono # (Auto)   


 


Eos # (Auto)   


 


Baso # (Auto)   


 


PT   


 


INR   


 


APTT   


 


Sodium   


 


Potassium   


 


Chloride   


 


Carbon Dioxide   


 


Anion Gap   


 


BUN   


 


Creatinine   


 


Est GFR ( Amer)   


 


Est GFR (Non-Af Amer)   


 


POC Glucose (mg/dL)   


 


Random Glucose   


 


Calcium   


 


Phosphorus   


 


Magnesium   


 


Total Bilirubin   


 


AST   


 


ALT   


 


Alkaline Phosphatase   


 


Total Protein   


 


Albumin   


 


Globulin   


 


Albumin/Globulin Ratio   


 


Lipase   


 


Urine Color   Yellow 


 


Urine Clarity   Clear 


 


Urine pH   5.0 


 


Ur Specific Gravity   1.043 H 


 


Urine Protein   Negative 


 


Urine Glucose (UA)   Normal 


 


Urine Ketones   1+ H 


 


Urine Blood   1+ H 


 


Urine Nitrate   Negative 


 


Urine Bilirubin   Negative 


 


Urine Urobilinogen   Normal 


 


Ur Leukocyte Esterase   Neg 


 


Urine WBC (Auto)   1 


 


Urine RBC (Auto)   13 H 


 


Ur Squamous Epith Cells   2 


 


Urine HCG, Qual    Negative


 


Stool Occult Blood   


 


Urine Opiates Screen  Negative  


 


Urine Methadone Screen  Negative  


 


Ur Barbiturates Screen  Negative  


 


Ur Phencyclidine Scrn  Negative  


 


Ur Amphetamines Screen  Negative  


 


U Benzodiazepines Scrn  Negative  


 


U Oth Cocaine Metabols  Negative  


 


U Cannabinoids Screen  Negative  


 


Alcohol, Quantitative   














  18





  06:56 06:56 08:38


 


WBC  5.0  


 


RBC  3.27 L  


 


Hgb  6.8 L  


 


Hct  21.3 L  


 


MCV  65.3 L  


 


MCH  20.8 L  


 


MCHC  31.9 L  


 


RDW  18.8 H  


 


Plt Count  321  


 


MPV  7.9  


 


Neut % (Auto)  71.5  


 


Lymph % (Auto)  21.6  


 


Mono % (Auto)  5.4  


 


Eos % (Auto)  0.2  


 


Baso % (Auto)  1.3  


 


Neut # (Auto)  3.6  


 


Lymph # (Auto)  1.1  


 


Mono # (Auto)  0.3  


 


Eos # (Auto)  0.0  


 


Baso # (Auto)  0.1  


 


PT    13.0 H


 


INR    1.1


 


APTT    25


 


Sodium   


 


Potassium   


 


Chloride   


 


Carbon Dioxide   


 


Anion Gap   


 


BUN   


 


Creatinine   


 


Est GFR ( Amer)   


 


Est GFR (Non-Af Amer)   


 


POC Glucose (mg/dL)   


 


Random Glucose   


 


Calcium   


 


Phosphorus   


 


Magnesium   


 


Total Bilirubin   


 


AST   


 


ALT   


 


Alkaline Phosphatase   


 


Total Protein   


 


Albumin   


 


Globulin   


 


Albumin/Globulin Ratio   


 


Lipase   


 


Urine Color   


 


Urine Clarity   


 


Urine pH   


 


Ur Specific Gravity   


 


Urine Protein   


 


Urine Glucose (UA)   


 


Urine Ketones   


 


Urine Blood   


 


Urine Nitrate   


 


Urine Bilirubin   


 


Urine Urobilinogen   


 


Ur Leukocyte Esterase   


 


Urine WBC (Auto)   


 


Urine RBC (Auto)   


 


Ur Squamous Epith Cells   


 


Urine HCG, Qual   


 


Stool Occult Blood   Positive H 


 


Urine Opiates Screen   


 


Urine Methadone Screen   


 


Ur Barbiturates Screen   


 


Ur Phencyclidine Scrn   


 


Ur Amphetamines Screen   


 


U Benzodiazepines Scrn   


 


U Oth Cocaine Metabols   


 


U Cannabinoids Screen   


 


Alcohol, Quantitative   














  18





  08:38


 


WBC 


 


RBC 


 


Hgb 


 


Hct 


 


MCV 


 


MCH 


 


MCHC 


 


RDW 


 


Plt Count 


 


MPV 


 


Neut % (Auto) 


 


Lymph % (Auto) 


 


Mono % (Auto) 


 


Eos % (Auto) 


 


Baso % (Auto) 


 


Neut # (Auto) 


 


Lymph # (Auto) 


 


Mono # (Auto) 


 


Eos # (Auto) 


 


Baso # (Auto) 


 


PT 


 


INR 


 


APTT 


 


Sodium 


 


Potassium 


 


Chloride 


 


Carbon Dioxide 


 


Anion Gap 


 


BUN 


 


Creatinine 


 


Est GFR ( Amer) 


 


Est GFR (Non-Af Amer) 


 


POC Glucose (mg/dL) 


 


Random Glucose 


 


Calcium 


 


Phosphorus  3.4


 


Magnesium  1.5 L


 


Total Bilirubin 


 


AST 


 


ALT 


 


Alkaline Phosphatase 


 


Total Protein 


 


Albumin 


 


Globulin 


 


Albumin/Globulin Ratio 


 


Lipase 


 


Urine Color 


 


Urine Clarity 


 


Urine pH 


 


Ur Specific Gravity 


 


Urine Protein 


 


Urine Glucose (UA) 


 


Urine Ketones 


 


Urine Blood 


 


Urine Nitrate 


 


Urine Bilirubin 


 


Urine Urobilinogen 


 


Ur Leukocyte Esterase 


 


Urine WBC (Auto) 


 


Urine RBC (Auto) 


 


Ur Squamous Epith Cells 


 


Urine HCG, Qual 


 


Stool Occult Blood 


 


Urine Opiates Screen 


 


Urine Methadone Screen 


 


Ur Barbiturates Screen 


 


Ur Phencyclidine Scrn 


 


Ur Amphetamines Screen 


 


U Benzodiazepines Scrn 


 


U Oth Cocaine Metabols 


 


U Cannabinoids Screen 


 


Alcohol, Quantitative 














Assessment & Plan


(1) Symptomatic anemia


Assessment and Plan: 


Hx of uterine fibroids w/ heavy periods and currently menstruating


Also w/ recent NSAID use and epigastric pain and Stool Guiac POSITIVE, denies 

black or bloody stools


Vaginal bleeding vs GI bleed or both?


Dilutional component (2L fluid bolus) could contribute to precepitous drop from 

Hgb 7.7 -> 6.8 within 2 hours


GI consult, Dr Werner. Help appreciated.





Labs/Diagnostics:


F/U ferritin, folate, haptoglobin, vitamin b12, iron/tibc, hemoglobinopathy eval





Meds:


Administer 2units pRBC


Protonix 40mg IVP Q12H


NPO for now





Imaging:


Pelvic/Transvaginal US 2017: Uterus/cervix:  Transvaginally the endometrial 

stripe measures 1.7 CM.  A fibroid in the anterior mid uterus measures 2.7 x 

2.5 x 2.7 CM. A second fibroid in the posterior mid uterus measures 1.7 x 2.0 x 

1.7 CM.  


Pelvic/Abd CT w/ IV contrast 2017: 1. Moderate to large amount retained 

stool. Correlate for constipation. 2. Small amount of free fluid in the pelvis. 


Status: Acute   Priority: High   





(2) Uterine fibroid


Assessment and Plan: 


Patient with known hx of Uterine Fibroids w/ heavy bleeding


Chart review shows patient was to have a hysterectomy with OB/GYN Dr. Andrew Rocha - will follow-up


Vaginal ultrasound cancelled today 3/28/18 due to patient becoming unresponsive





Imaging:


Pelvic/Transvaginal US 2017: Uterus/cervix:  Transvaginally the endometrial 

stripe measures 1.7 CM.  A fibroid in the anterior mid uterus measures 2.7 x 

2.5 x 2.7 CM. A second fibroid in the posterior mid uterus measures 1.7 x 2.0 x 

1.7 CM.  


Status: Acute   Priority: High   





(3) Abdominal pain


Assessment and Plan: 


Epigastric tenderness for 2 days PTA - took motrin at home (unknown quantity) w/

o relief


Stool Guiac POSITIVE, denies black or bloody stools


GI consult, Dr Werner. Help appreciated.





Labs/Diagnostics:


Lipase NORMAL, AST/ALT NORMAL


HCG NEGATIVE


Non-drinker, UDS NEGATIVE





Meds:


Protonix 40mg IVP Q12H 


NPO for now





Imaging:


Pelvic/Abd CT w/ IV contrast 2017: 1. Moderate to large amount retained 

stool. Correlate for constipation. 2. Small amount of free fluid in the pelvis. 


Status: Acute   Priority: High   





(4) Prophylactic measure


Assessment and Plan: 


Protonix 40mg IVP Q12H


SCDs


NPO for now


AO contraindicated 2/2 to anemia


Status: Acute   Priority: High   





<Anastasia Garza - Last Filed: 18 12:41>





Results





- Vital Signs


Recent Vital Signs: 





 Last Vital Signs











Temp  98.3 F   18 12:24


 


Pulse  62   18 12:24


 


Resp  18   18 12:24


 


BP  95/62 L  18 12:24


 


Pulse Ox  99   18 12:24














- Labs


Result Diagrams: 


 18 06:56





 18 04:43


Labs: 





 Laboratory Results - last 24 hr











  18





  04:18 04:43 04:43


 


WBC   6.6 


 


RBC   3.66 L 


 


Hgb   7.7 L 


 


Hct   23.9 L 


 


MCV   65.2 L 


 


MCH   21.1 L 


 


MCHC   32.4 L 


 


RDW   18.5 H 


 


Plt Count   373 


 


MPV   8.2 


 


Neut % (Auto)   53.3 


 


Lymph % (Auto)   39.8 


 


Mono % (Auto)   5.2 


 


Eos % (Auto)   0.5 


 


Baso % (Auto)   1.2 


 


Neut # (Auto)   3.5 


 


Lymph # (Auto)   2.6 


 


Mono # (Auto)   0.3 


 


Eos # (Auto)   0.0 


 


Baso # (Auto)   0.1 


 


Retic Count   


 


Haptoglobin   


 


PT   


 


INR   


 


APTT   


 


Sodium    136


 


Potassium    2.9 L


 


Chloride    100


 


Carbon Dioxide    21 L


 


Anion Gap    18


 


BUN    9


 


Creatinine    0.5 L


 


Est GFR ( Amer)    > 60


 


Est GFR (Non-Af Amer)    > 60


 


POC Glucose (mg/dL)  136 H  


 


Random Glucose    156 H


 


Calcium    8.9


 


Phosphorus   


 


Magnesium   


 


Iron   


 


TIBC   


 


% Saturation   


 


Ferritin   


 


Total Bilirubin    0.8


 


AST    26


 


ALT    40


 


Alkaline Phosphatase    68


 


Total Protein    7.4


 


Albumin    4.0


 


Globulin    3.4


 


Albumin/Globulin Ratio    1.2


 


Lipase    101


 


Vitamin B12   


 


Folate   


 


Urine Color   


 


Urine Clarity   


 


Urine pH   


 


Ur Specific Gravity   


 


Urine Protein   


 


Urine Glucose (UA)   


 


Urine Ketones   


 


Urine Blood   


 


Urine Nitrate   


 


Urine Bilirubin   


 


Urine Urobilinogen   


 


Ur Leukocyte Esterase   


 


Urine WBC (Auto)   


 


Urine RBC (Auto)   


 


Ur Squamous Epith Cells   


 


Urine HCG, Qual   


 


Stool Occult Blood   


 


Urine Opiates Screen   


 


Urine Methadone Screen   


 


Ur Barbiturates Screen   


 


Ur Phencyclidine Scrn   


 


Ur Amphetamines Screen   


 


U Benzodiazepines Scrn   


 


U Oth Cocaine Metabols   


 


U Cannabinoids Screen   


 


Alcohol, Quantitative    < 10


 


Blood Type   


 


Blood Type Confirm   


 


Antibody Screen   














  18





  06:27 06:27 06:27


 


WBC   


 


RBC   


 


Hgb   


 


Hct   


 


MCV   


 


MCH   


 


MCHC   


 


RDW   


 


Plt Count   


 


MPV   


 


Neut % (Auto)   


 


Lymph % (Auto)   


 


Mono % (Auto)   


 


Eos % (Auto)   


 


Baso % (Auto)   


 


Neut # (Auto)   


 


Lymph # (Auto)   


 


Mono # (Auto)   


 


Eos # (Auto)   


 


Baso # (Auto)   


 


Retic Count   


 


Haptoglobin   


 


PT   


 


INR   


 


APTT   


 


Sodium   


 


Potassium   


 


Chloride   


 


Carbon Dioxide   


 


Anion Gap   


 


BUN   


 


Creatinine   


 


Est GFR ( Amer)   


 


Est GFR (Non-Af Amer)   


 


POC Glucose (mg/dL)   


 


Random Glucose   


 


Calcium   


 


Phosphorus   


 


Magnesium   


 


Iron   


 


TIBC   


 


% Saturation   


 


Ferritin   


 


Total Bilirubin   


 


AST   


 


ALT   


 


Alkaline Phosphatase   


 


Total Protein   


 


Albumin   


 


Globulin   


 


Albumin/Globulin Ratio   


 


Lipase   


 


Vitamin B12   


 


Folate   


 


Urine Color   Yellow 


 


Urine Clarity   Clear 


 


Urine pH   5.0 


 


Ur Specific Gravity   1.043 H 


 


Urine Protein   Negative 


 


Urine Glucose (UA)   Normal 


 


Urine Ketones   1+ H 


 


Urine Blood   1+ H 


 


Urine Nitrate   Negative 


 


Urine Bilirubin   Negative 


 


Urine Urobilinogen   Normal 


 


Ur Leukocyte Esterase   Neg 


 


Urine WBC (Auto)   1 


 


Urine RBC (Auto)   13 H 


 


Ur Squamous Epith Cells   2 


 


Urine HCG, Qual    Negative


 


Stool Occult Blood   


 


Urine Opiates Screen  Negative  


 


Urine Methadone Screen  Negative  


 


Ur Barbiturates Screen  Negative  


 


Ur Phencyclidine Scrn  Negative  


 


Ur Amphetamines Screen  Negative  


 


U Benzodiazepines Scrn  Negative  


 


U Oth Cocaine Metabols  Negative  


 


U Cannabinoids Screen  Negative  


 


Alcohol, Quantitative   


 


Blood Type   


 


Blood Type Confirm   


 


Antibody Screen   














  18





  06:56 06:56 08:38


 


WBC  5.0  


 


RBC  3.27 L  


 


Hgb  6.8 L  


 


Hct  21.3 L  


 


MCV  65.3 L  


 


MCH  20.8 L  


 


MCHC  31.9 L  


 


RDW  18.8 H  


 


Plt Count  321  


 


MPV  7.9  


 


Neut % (Auto)  71.5  


 


Lymph % (Auto)  21.6  


 


Mono % (Auto)  5.4  


 


Eos % (Auto)  0.2  


 


Baso % (Auto)  1.3  


 


Neut # (Auto)  3.6  


 


Lymph # (Auto)  1.1  


 


Mono # (Auto)  0.3  


 


Eos # (Auto)  0.0  


 


Baso # (Auto)  0.1  


 


Retic Count  1.0  


 


Haptoglobin   


 


PT    13.0 H


 


INR    1.1


 


APTT    25


 


Sodium   


 


Potassium   


 


Chloride   


 


Carbon Dioxide   


 


Anion Gap   


 


BUN   


 


Creatinine   


 


Est GFR ( Amer)   


 


Est GFR (Non-Af Amer)   


 


POC Glucose (mg/dL)   


 


Random Glucose   


 


Calcium   


 


Phosphorus   


 


Magnesium   


 


Iron   


 


TIBC   


 


% Saturation   


 


Ferritin   


 


Total Bilirubin   


 


AST   


 


ALT   


 


Alkaline Phosphatase   


 


Total Protein   


 


Albumin   


 


Globulin   


 


Albumin/Globulin Ratio   


 


Lipase   


 


Vitamin B12   


 


Folate   


 


Urine Color   


 


Urine Clarity   


 


Urine pH   


 


Ur Specific Gravity   


 


Urine Protein   


 


Urine Glucose (UA)   


 


Urine Ketones   


 


Urine Blood   


 


Urine Nitrate   


 


Urine Bilirubin   


 


Urine Urobilinogen   


 


Ur Leukocyte Esterase   


 


Urine WBC (Auto)   


 


Urine RBC (Auto)   


 


Ur Squamous Epith Cells   


 


Urine HCG, Qual   


 


Stool Occult Blood   Positive H 


 


Urine Opiates Screen   


 


Urine Methadone Screen   


 


Ur Barbiturates Screen   


 


Ur Phencyclidine Scrn   


 


Ur Amphetamines Screen   


 


U Benzodiazepines Scrn   


 


U Oth Cocaine Metabols   


 


U Cannabinoids Screen   


 


Alcohol, Quantitative   


 


Blood Type   


 


Blood Type Confirm   


 


Antibody Screen   














  18





  08:38 08:38 09:08


 


WBC   


 


RBC   


 


Hgb   


 


Hct   


 


MCV   


 


MCH   


 


MCHC   


 


RDW   


 


Plt Count   


 


MPV   


 


Neut % (Auto)   


 


Lymph % (Auto)   


 


Mono % (Auto)   


 


Eos % (Auto)   


 


Baso % (Auto)   


 


Neut # (Auto)   


 


Lymph # (Auto)   


 


Mono # (Auto)   


 


Eos # (Auto)   


 


Baso # (Auto)   


 


Retic Count   


 


Haptoglobin   


 


PT   


 


INR   


 


APTT   


 


Sodium   


 


Potassium   


 


Chloride   


 


Carbon Dioxide   


 


Anion Gap   


 


BUN   


 


Creatinine   


 


Est GFR ( Amer)   


 


Est GFR (Non-Af Amer)   


 


POC Glucose (mg/dL)   


 


Random Glucose   


 


Calcium   


 


Phosphorus  3.4  


 


Magnesium  1.5 L  


 


Iron    < 10 L


 


TIBC    454 H


 


% Saturation    2.20 L


 


Ferritin  6.0  


 


Total Bilirubin   


 


AST   


 


ALT   


 


Alkaline Phosphatase   


 


Total Protein   


 


Albumin   


 


Globulin   


 


Albumin/Globulin Ratio   


 


Lipase   


 


Vitamin B12  393  


 


Folate  16.8  


 


Urine Color   


 


Urine Clarity   


 


Urine pH   


 


Ur Specific Gravity   


 


Urine Protein   


 


Urine Glucose (UA)   


 


Urine Ketones   


 


Urine Blood   


 


Urine Nitrate   


 


Urine Bilirubin   


 


Urine Urobilinogen   


 


Ur Leukocyte Esterase   


 


Urine WBC (Auto)   


 


Urine RBC (Auto)   


 


Ur Squamous Epith Cells   


 


Urine HCG, Qual   


 


Stool Occult Blood   


 


Urine Opiates Screen   


 


Urine Methadone Screen   


 


Ur Barbiturates Screen   


 


Ur Phencyclidine Scrn   


 


Ur Amphetamines Screen   


 


U Benzodiazepines Scrn   


 


U Oth Cocaine Metabols   


 


U Cannabinoids Screen   


 


Alcohol, Quantitative   


 


Blood Type   O POSITIVE 


 


Blood Type Confirm   O POSITIVE 


 


Antibody Screen   Negative 














  18





  09:08


 


WBC 


 


RBC 


 


Hgb 


 


Hct 


 


MCV 


 


MCH 


 


MCHC 


 


RDW 


 


Plt Count 


 


MPV 


 


Neut % (Auto) 


 


Lymph % (Auto) 


 


Mono % (Auto) 


 


Eos % (Auto) 


 


Baso % (Auto) 


 


Neut # (Auto) 


 


Lymph # (Auto) 


 


Mono # (Auto) 


 


Eos # (Auto) 


 


Baso # (Auto) 


 


Retic Count 


 


Haptoglobin  103.3


 


PT 


 


INR 


 


APTT 


 


Sodium 


 


Potassium 


 


Chloride 


 


Carbon Dioxide 


 


Anion Gap 


 


BUN 


 


Creatinine 


 


Est GFR ( Amer) 


 


Est GFR (Non-Af Amer) 


 


POC Glucose (mg/dL) 


 


Random Glucose 


 


Calcium 


 


Phosphorus 


 


Magnesium 


 


Iron 


 


TIBC 


 


% Saturation 


 


Ferritin 


 


Total Bilirubin 


 


AST 


 


ALT 


 


Alkaline Phosphatase 


 


Total Protein 


 


Albumin 


 


Globulin 


 


Albumin/Globulin Ratio 


 


Lipase 


 


Vitamin B12 


 


Folate 


 


Urine Color 


 


Urine Clarity 


 


Urine pH 


 


Ur Specific Gravity 


 


Urine Protein 


 


Urine Glucose (UA) 


 


Urine Ketones 


 


Urine Blood 


 


Urine Nitrate 


 


Urine Bilirubin 


 


Urine Urobilinogen 


 


Ur Leukocyte Esterase 


 


Urine WBC (Auto) 


 


Urine RBC (Auto) 


 


Ur Squamous Epith Cells 


 


Urine HCG, Qual 


 


Stool Occult Blood 


 


Urine Opiates Screen 


 


Urine Methadone Screen 


 


Ur Barbiturates Screen 


 


Ur Phencyclidine Scrn 


 


Ur Amphetamines Screen 


 


U Benzodiazepines Scrn 


 


U Oth Cocaine Metabols 


 


U Cannabinoids Screen 


 


Alcohol, Quantitative 


 


Blood Type 


 


Blood Type Confirm 


 


Antibody Screen 














Attending/Attestation





- Attestation


I have personally seen and examined this patient.: Yes


I have fully participated in the care of the patient.: Yes


I have reviewed all pertinent clinical information: Yes


Notes (Text): 


Patient was seen and examined at ER


History taken with the help of [place] speaking staff. Has h/o haevy 

periods.Has monthly periods now. Denies flavio.H/o eppigastric pain ,vomiting 

and nsaids use .Recurrent ER visits in the past for abdominal pain. CT reports 

reviewed


Patient was seen by gynecologist  as an out pt.No heavy periods at this time.


on examination she is not tachycardia,alert oriented x 3. has mild epigastric 

tenderness. Stool OB positive(has her periods?)


but has high possibility of peptic ulcer disease and complaining of abd pain.we 

will get GI consult


d/w the resident


i agree with the documentation of the assessment and the plan


18 12:35

## 2018-03-29 VITALS
TEMPERATURE: 98 F | DIASTOLIC BLOOD PRESSURE: 67 MMHG | SYSTOLIC BLOOD PRESSURE: 106 MMHG | OXYGEN SATURATION: 100 % | RESPIRATION RATE: 18 BRPM

## 2018-03-29 VITALS — HEART RATE: 84 BPM

## 2018-03-29 LAB
ALBUMIN SERPL-MCNC: 3 G/DL (ref 3.5–5)
ALBUMIN/GLOB SERPL: 1 {RATIO} (ref 1–2.1)
ALT SERPL-CCNC: 88 U/L (ref 9–52)
AST SERPL-CCNC: 61 U/L (ref 14–36)
BASOPHILS # BLD AUTO: 0.1 K/UL (ref 0–0.2)
BASOPHILS NFR BLD: 0.9 % (ref 0–2)
BUN SERPL-MCNC: 6 MG/DL (ref 7–17)
CALCIUM SERPL-MCNC: 8.1 MG/DL (ref 8.6–10.4)
EOSINOPHIL # BLD AUTO: 0.1 K/UL (ref 0–0.7)
EOSINOPHIL NFR BLD: 1.1 % (ref 0–4)
ERYTHROCYTE [DISTWIDTH] IN BLOOD BY AUTOMATED COUNT: 22.8 % (ref 11.5–14.5)
GFR NON-AFRICAN AMERICAN: > 60
HGB BLD-MCNC: 9.3 G/DL (ref 11–16)
LYMPHOCYTES # BLD AUTO: 2.6 K/UL (ref 1–4.3)
LYMPHOCYTES NFR BLD AUTO: 47.7 % (ref 20–40)
MCH RBC QN AUTO: 22.3 PG (ref 27–31)
MCHC RBC AUTO-ENTMCNC: 31.7 G/DL (ref 33–37)
MCV RBC AUTO: 70.2 FL (ref 81–99)
MONOCYTES # BLD: 0.4 K/UL (ref 0–0.8)
MONOCYTES NFR BLD: 7.4 % (ref 0–10)
NEUTROPHILS # BLD: 2.3 K/UL (ref 1.8–7)
NEUTROPHILS NFR BLD AUTO: 42.9 % (ref 50–75)
NRBC BLD AUTO-RTO: 0.1 % (ref 0–2)
PLATELET # BLD: 333 K/UL (ref 130–400)
PMV BLD AUTO: 8.3 FL (ref 7.2–11.7)
RBC # BLD AUTO: 4.16 MIL/UL (ref 3.8–5.2)
WBC # BLD AUTO: 5.4 K/UL (ref 4.8–10.8)

## 2018-03-29 PROCEDURE — 0DB68ZX EXCISION OF STOMACH, VIA NATURAL OR ARTIFICIAL OPENING ENDOSCOPIC, DIAGNOSTIC: ICD-10-PCS | Performed by: INTERNAL MEDICINE

## 2018-03-29 PROCEDURE — 0DB98ZX EXCISION OF DUODENUM, VIA NATURAL OR ARTIFICIAL OPENING ENDOSCOPIC, DIAGNOSTIC: ICD-10-PCS | Performed by: INTERNAL MEDICINE

## 2018-03-29 NOTE — CP.PCM.DIS
Provider





- Provider


Date of Admission: 


18 08:20





Attending physician: 


Anastasia Garza MD





Primary care physician: 


PMD: None





Consults: 


GI: Dr Yarbrough





Time Spent in preparation of Discharge (in minutes): 36





Diagnosis





- Discharge Diagnosis


(1) Symptomatic anemia


Status: Resolved   Priority: High   





(2) Uterine fibroid


Status: Chronic   Priority: High   





(3) Abdominal pain


Status: Resolved   Priority: High   





(4) Prophylactic measure


Status: Acute   Priority: Low   





Hospital Course





- Lab Results


Lab Results: 


 Most Recent Lab Values











WBC  5.4 K/uL (4.8-10.8)   18  07:13    


 


RBC  4.16 Mil/uL (3.80-5.20)   18  07:13    


 


Hgb  9.3 g/dL (11.0-16.0)  L D 18  07:13    


 


Hct  29.2 % (34.0-47.0)  L  18  07:13    


 


MCV  70.2 fL (81.0-99.0)  L D 18  07:13    


 


MCH  22.3 pg (27.0-31.0)  L  18  07:13    


 


MCHC  31.7 g/dL (33.0-37.0)  L  18  07:13    


 


RDW  22.8 % (11.5-14.5)  H  18  07:13    


 


Plt Count  333 K/uL (130-400)   18  07:13    


 


MPV  8.3 fL (7.2-11.7)   18  07:13    


 


Neut % (Auto)  42.9 % (50.0-75.0)  L  18  07:13    


 


Lymph % (Auto)  47.7 % (20.0-40.0)  H  18  07:13    


 


Mono % (Auto)  7.4 % (0.0-10.0)   18  07:13    


 


Eos % (Auto)  1.1 % (0.0-4.0)   18  07:13    


 


Baso % (Auto)  0.9 % (0.0-2.0)   18  07:13    


 


Neut # (Auto)  2.3 K/uL (1.8-7.0)   18  07:13    


 


Lymph # (Auto)  2.6 K/uL (1.0-4.3)   18  07:13    


 


Mono # (Auto)  0.4 K/uL (0.0-0.8)   18  07:13    


 


Eos # (Auto)  0.1 K/uL (0.0-0.7)   18  07:13    


 


Baso # (Auto)  0.1 K/uL (0.0-0.2)   18  07:13    


 


Retic Count  1.0 % (0.5-1.5)   18  06:56    


 


Haptoglobin  103.3 mg/dL (30.0-200.0)   18  09:08    


 


PT  13.0 SECONDS (9.7-12.2)  H  18  08:38    


 


INR  1.1   18  08:38    


 


APTT  25 SECONDS (21-34)   18  08:38    


 


Sodium  140 mmol/L (132-148)   18  07:13    


 


Potassium  3.3 mmol/L (3.6-5.2)  L  18  07:13    


 


Chloride  107 mmol/L ()   18  07:13    


 


Carbon Dioxide  23 mmol/L (22-30)   18  07:13    


 


Anion Gap  13  (10-20)   18  07:13    


 


BUN  6 mg/dL (7-17)  L  18  07:13    


 


Creatinine  0.5 mg/dL (0.7-1.2)  L  18  07:13    


 


Est GFR ( Amer)  > 60   18  07:13    


 


Est GFR (Non-Af Amer)  > 60   18  07:13    


 


POC Glucose (mg/dL)  136 mg/dL ()  H  18  04:18    


 


Random Glucose  78 mg/dL ()   18  07:13    


 


Calcium  8.1 mg/dl (8.6-10.4)  L  18  07:13    


 


Phosphorus  3.4 mg/dL (2.5-4.5)   18  08:38    


 


Magnesium  1.5 mg/dL (1.6-2.3)  L  18  08:38    


 


Iron  < 10 ug/dL ()  L  18  09:08    


 


TIBC  454 ug/dL (250-450)  H  18  09:08    


 


% Saturation  2.20  (20-55)  L  18  09:08    


 


Ferritin  6.0 ng/mL  18  08:38    


 


Total Bilirubin  1.8 mg/dL (0.2-1.3)  H  18  07:13    


 


AST  61 U/L (14-36)  H D 18  07:13    


 


ALT  88 U/L (9-52)  H D 18  07:13    


 


Alkaline Phosphatase  49 U/L ()   18  07:13    


 


Total Protein  5.9 g/dL (6.3-8.3)  L  18  07:13    


 


Albumin  3.0 g/dL (3.5-5.0)  L D 18  07:13    


 


Globulin  2.9 gm/dL (2.2-3.9)   18  07:13    


 


Albumin/Globulin Ratio  1.0  (1.0-2.1)   18  07:13    


 


Lipase  101 U/L ()   18  04:43    


 


Vitamin B12  393 pg/mL (239-931)   18  08:38    


 


Folate  16.8 ng/mL  18  08:38    


 


Urine Color  Yellow  (YELLOW)   18  06:27    


 


Urine Clarity  Clear  (Clear)   18  06:27    


 


Urine pH  5.0  (5.0-8.0)   18  06:27    


 


Ur Specific Gravity  1.043  (1.003-1.030)  H  18  06:27    


 


Urine Protein  Negative mg/dL (NEGATIVE)   18  06:27    


 


Urine Glucose (UA)  Normal mg/dL (Normal)   18  06:27    


 


Urine Ketones  1+ mg/dL (NEGATIVE)  H  18  06:27    


 


Urine Blood  1+  (NEGATIVE)  H  18  06:27    


 


Urine Nitrate  Negative  (NEGATIVE)   18  06:27    


 


Urine Bilirubin  Negative  (NEGATIVE)   18  06:27    


 


Urine Urobilinogen  Normal mg/dL (0.2-1.0)   18  06:27    


 


Ur Leukocyte Esterase  Neg Jamaal/uL (Negative)   18  06:27    


 


Urine WBC (Auto)  1 /hpf (0-5)   18  06:27    


 


Urine RBC (Auto)  13 /hpf (0-3)  H  18  06:27    


 


Ur Squamous Epith Cells  2 /hpf (0-5)   18  06:27    


 


Urine HCG, Qual  Negative  (NEGATIVE)   18  13:32    


 


Stool Occult Blood  Positive  (NEGATIVE)  H  18  06:56    


 


Urine Opiates Screen  Negative  (NEGATIVE)   18  06:27    


 


Urine Methadone Screen  Negative  (NEGATIVE)   18  06:27    


 


Ur Barbiturates Screen  Negative  (NEGATIVE)   18  06:27    


 


Ur Phencyclidine Scrn  Negative  (NEGATIVE)   18  06:27    


 


Ur Amphetamines Screen  Negative  (NEGATIVE)   18  06:27    


 


U Benzodiazepines Scrn  Negative  (NEGATIVE)   18  06:27    


 


U Oth Cocaine Metabols  Negative  (NEGATIVE)   18  06:27    


 


U Cannabinoids Screen  Negative  (NEGATIVE)   18  06:27    


 


Alcohol, Quantitative  < 10 mg/dl (0-10)   18  04:43    


 


Blood Type  O POSITIVE   18  08:38    


 


Blood Type Confirm  O POSITIVE   18  08:38    


 


Antibody Screen  Negative   18  08:38    














- Hospital Course


Hospital Course: 


CC: Symptomatic Anemia





HPI: 37 year old  female with PMHx of uterine fibroids, anemia, migraine

, depression, presents to ER with weakness, epigastric pain, nausea, and 

vomiting. In the ER, patient was pale, lethargic, and had a measured Hgb of 

7.7. ER attending discussed case with OB attending who advised patient be sent 

to have pelvic ultrasound. In ultrasound room patient became unresponsive and 

rapid response was called. Patient's responded to sternal rub but remained 

lethargic. Patient's vitals remained stable and she was transferred to ER.  

Patient interview was conducted via  (hospital ). 

Patient was lethargic and possibly poor historian. Patient states she takes 

tylenol and ibuprofen for pain occasionally. Patient denies any mary blood in 

stool and states her stools are yellow colored. Patient denies vomiting any 

blood or change of pain level with food.  Patient states she has pain in her 

stomach region that has been present for the past 2 days. Stool occult blood 

was positive in ER. She states she is currently on her period. Her periods 

occur regularly monthly with heavy flow. She states she uses one "big pad" per 

day of her period.   





Patient was previously in ER on 3/24/18 for migraines. She took ibuprofen and 

vicodin for migraine pain. On that visit her Hgb was 9.1 which it has been 

trending down from a peak of 9.8 on 17. Prior chart review also states 

patient was to have a hysterectomy with OBGYN Dr. Andrew Rocha.





PMHx: fibroids, migraine, depression, chronic anemia (2/2 to fibroids?)


PSHx: 4  sections, appendectomy and as per prior charts cholecystectomy 


Home meds: Motrin as needed


Allergies: denies


FamilyHx: unknown


SocialHx: Patient denies smoking, drinking or recreational drug use. Patient 

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 Chart review shows patient is 

recently . 





HOSPITAL COURSE:





37 year old  female with PMHx of uterine fibroids, anemia, migraine and 

depression presented to ER with weakness, epigastric pain, nausea and vomiting. 

Patients Hgb was 6.7 in ER. Rapid response was called as patient became 

unresponsive en route for pelvic ultrasound. Patient became responsive quickly, 

was treated with 2 units of packed red blood cells, protonix 40 mg IV q12 and 

NS 500mls at 1L/hr. Dr. Werner from GI was consulted as patient was found to 

have (+) stool occult and was scheduled for EGD. EGD showed gastritis, antral 

erosions, there was no evidence of active or recent bleeding on examination. It'

s possible her positive stool occult was due to contamination as patient was 

currently on her period.





Patient was instructed to follow up with her OBGYN Dr. Andrew Rocha to re-

discuss potential hysterectomy which previous charts had shown patient had 

interest in. Upon discharge patient's epigastric pain had decreased, was no 

longer nauseous or vomiting, and her hemoglobin was elevated to 9.3. 








(1) Symptomatic anemia


Assessment and Plan: 


Hx of uterine fibroids w/ heavy periods and currently menstruating


Also w/ recent NSAID use and epigastric pain and Stool Guiac POSITIVE, denies 

black or bloody stools


Vaginal bleeding vs GI bleed or both?


Dilutional component (2L fluid bolus) could contribute to precepitous drop from 

Hgb 7.7 -> 6.8 within 2 hours


GI consult, Dr Werner. Help appreciated.


* s/p EGD showing gastritis, antral erosions.  No evidence of active or recent 

bleeding on examination.


* Advance diet as tolerated


* Continue with PPI therapy. No aspirin, ibuprofen, naproxen or other NSAIDs.


* Await EGD biopsy pathology results


* Follow up GYN recommendations


* No further planned inpatient GI intervention, will sign off case.  Please 

reconsult as necessary, thank you





Labs/Diagnostics:


Hgb 6.8 -> 9.3 (s/p 2units pRBC)


Iron studies indicative of Iron Deficiency Anemia


Iron LOW, % Saturation LOW, TIBC HIGH, Ferritin LOW


B12, Folate NORMAL





Meds:


s/p 2units pRBC 3/28/18


Protonix 40mg IVP Q12H


Ferrlicet 125mg IV ONCE 3/29/18


Restarted diet - heart healthy





Imaging:


Pelvic/Transvaginal US 2017: Uterus/cervix:  Transvaginally the endometrial 

stripe measures 1.7 CM.  A fibroid in the anterior mid uterus measures 2.7 x 

2.5 x 2.7 CM. A second fibroid in the posterior mid uterus measures 1.7 x 2.0 x 

1.7 CM.  


Pelvic/Abd CT w/ IV contrast 2017: 1. Moderate to large amount retained 

stool. Correlate for constipation. 2. Small amount of free fluid in the pelvis. 


Status: Acute   Priority: High   





(2) Uterine fibroid


Assessment and Plan: 


Patient with known hx of Uterine Fibroids w/ heavy bleeding


Chart review shows patient was to have a hysterectomy with OB/GYN Dr. Andrew Rocha - will follow-up


Vaginal ultrasound cancelled 3/28/18 due to patient becoming unresponsive





Imaging:


Pelvic/Transvaginal US 2017: Uterus/cervix:  Transvaginally the endometrial 

stripe measures 1.7 CM.  A fibroid in the anterior mid uterus measures 2.7 x 

2.5 x 2.7 CM. A second fibroid in the posterior mid uterus measures 1.7 x 2.0 x 

1.7 CM.  


Status: Acute   Priority: High   





(3) Abdominal pain


Assessment and Plan: 


Epigastric tenderness for 2 days PTA - took motrin at home (unknown quantity) w/

o relief


Stool Guiac POSITIVE, denies black or bloody stools


* Possible contamination as patient currently on her period


* Repeat Stool Occult Test


GI consult, Dr Werner. Help appreciated.


* See EGD results and GI plan shown above.





Labs/Diagnostics:


Lipase NORMAL, AST/ALT UPTRENDING


HCG NEGATIVE


Non-drinker, UDS NEGATIVE





Meds:


Protonix 40mg IVP Q12H 


Restarted diet - Heart Healthy





Imaging:


Pelvic/Abd CT w/ IV contrast 2017: 1. Moderate to large amount retained 

stool. Correlate for constipation. 2. Small amount of free fluid in the pelvis. 


Status: Acute   Priority: High   





(4) Prophylactic measure


Assessment and Plan: 


Protonix 40mg IVP Q12H


SCDs


NPO for now


AO contraindicated 2/2 to anemia


Status: Acute   Priority: High   





Disposition: She will need to follow-up with her outpatient OBGYN Dr. Andrew Rocha regarding her previously planned but unperformed hysterectomy. She 

will need to be discharged with PPI and Feosol tablets.








Discharge Exam





- Head Exam


Head Exam: ATRAUMATIC, NORMOCEPHALIC





- Additional Findings


Additional findings: 


- Constitutional


Additional comments: 


Lethargic





- Head Exam


Head Exam: ATRAUMATIC, NORMAL INSPECTION





- Eye Exam


Eye Exam: EOMI


Pupil Exam: PERRL





- ENT Exam


ENT Exam: Mucous Membranes Dry





- Neck Exam


Neck exam: Positive for: Normal Inspection.  Negative for: Lymphadenopathy, 

Tenderness





- Respiratory Exam


Respiratory Exam: Clear to Auscultation Bilateral, NORMAL BREATHING PATTERN.  

absent: Rales, Rhonchi, Wheezes





- Cardiovascular Exam


Cardiovascular Exam: REGULAR RHYTHM, +S1, +S2.  absent: Bradycardia, Tachycardia

, JVD, Systolic Murmur





- GI/Abdominal Exam


GI & Abdominal Exam: Normal Bowel Sounds, Soft, Tenderness.  absent: Distended, 

Firm, Guarding, Hernia, Mass, Rebound, Rigid


Additional comments: 


Tenderness to palpation in epigastric region





- Rectal Exam


Rectal Exam: Deferred





- Extremities Exam


Extremities exam: Positive for: full ROM, normal capillary refill, normal 

inspection, pedal pulses present.  Negative for: calf tenderness, tenderness





- Neurological Exam


Neurological exam: Alert, Oriented x3





- Skin


Skin Exam: Intact, Pallor, Warm








Discharge Plan





- Discharge Medications


Prescriptions: 


Ferrous Sulfate 325 mg PO DAILY 30 Days #30 tablet


Pantoprazole [Protonix] 40 mg PO DAILY 30 Days #30 ect





- Follow Up Plan


Condition: FAIR


Disposition: HOME/ ROUTINE


Additional Instructions: 


Patient is medically stable for discharge.





Patient should take the following medications which will be electronically sent 

to her pharmacy ([facility] at [address] [place] [address])


1. Protonix 40mg by mouth once a day (this will protect your stomach)


2. Ferrous Sulfate by mouth once a day (this will keep your iron levels at 

normal levels)





Patient should follow-up with her OBGYN Dr. Andrew Rocha so she may have a 

hysterectomy as we believe her low blood levels are due to her heavy bleeding 

due to her fibroids.





Dr Andrew Rocha


[phone]


[address] [place] [id number]





Patient should avoid taking aspirin, ibuprofen, motrin or other NSAIDS.





Please establish care with primary medical doctor so if symptoms like this 

occur in the future you will be able to go to your primary medical doctor and 

avoid a hospital admission. A primary medical doctor can also refer you to a GI 

doctor (stomach doctor) for continuing care.





If symptoms return please go to your nearest emergency department.


Referrals: 


[facility] [Outside]

## 2018-03-29 NOTE — CP.PCM.PN
Subjective





- Date & Time of Evaluation


Date of Evaluation: 03/29/18


Time of Evaluation: 14:28





- Subjective


Subjective: 


PGY-1 medicine note for Dr Garza.





No acute events noted overnight. Patient stated she had some lumbar area back 

pain. This could be consistent with her currently being on her period. 

Otherwise she did not offer any complaints. She is s/p EGD with Dr Yarbrough. All 

other ROS were negative.








Objective





- Vital Signs/Intake and Output


Vital Signs (last 24 hours): 


 











Temp Pulse Resp BP Pulse Ox


 


 97.9 F   72   20   115/64   100 


 


 03/29/18 14:15  03/29/18 14:15  03/29/18 14:15  03/29/18 14:15  03/29/18 14:15








Intake and Output: 


 











 03/29/18 03/29/18





 06:59 18:59


 


Intake Total 1855 


 


Balance 1855 














- Medications


Medications: 


 Current Medications





Pantoprazole Sodium (Protonix Inj)  40 mg IVP Q12H AMMON


   Last Admin: 03/29/18 08:23 Dose:  40 mg











- Labs


Labs: 


 





 03/29/18 07:13 





 03/29/18 07:13 





 











PT  13.0 SECONDS (9.7-12.2)  H  03/28/18  08:38    


 


INR  1.1   03/28/18  08:38    


 


APTT  25 SECONDS (21-34)   03/28/18  08:38    














- Additional Findings


Additional findings: 


(1) Symptomatic anemia


Assessment and Plan: 


Hx of uterine fibroids w/ heavy periods and currently menstruating


Also w/ recent NSAID use and epigastric pain and Stool Guiac POSITIVE, denies 

black or bloody stools


Vaginal bleeding vs GI bleed or both?


Dilutional component (2L fluid bolus) could contribute to precepitous drop from 

Hgb 7.7 -> 6.8 within 2 hours


GI consult, Dr Werner. Help appreciated.


* s/p EGD showing gastritis, antral erosions.  No evidence of active or recent 

bleeding on examination.


* Advance diet as tolerated


* Continue with PPI therapy. No aspirin, ibuprofen, naproxen or other NSAIDs.


* Await EGD biopsy pathology results


* Follow up GYN recommendations


* No further planned inpatient GI intervention, will sign off case.  Please 

reconsult as necessary, thank you





Labs/Diagnostics:


Hgb 6.8 -> 9.3 (s/p 2units pRBC)


Iron studies indicative of Iron Deficiency Anemia


Iron LOW, % Saturation LOW, TIBC HIGH, Ferritin LOW


B12, Folate NORMAL





Meds:


s/p 2units pRBC 3/28/18


Protonix 40mg IVP Q12H


Ferrlicet 125mg IV ONCE 3/29/18


Restarted diet - heart healthy





Imaging:


Pelvic/Transvaginal US 9/2017: Uterus/cervix:  Transvaginally the endometrial 

stripe measures 1.7 CM.  A fibroid in the anterior mid uterus measures 2.7 x 

2.5 x 2.7 CM. A second fibroid in the posterior mid uterus measures 1.7 x 2.0 x 

1.7 CM.  


Pelvic/Abd CT w/ IV contrast 9/2017: 1. Moderate to large amount retained 

stool. Correlate for constipation. 2. Small amount of free fluid in the pelvis. 


Status: Acute   Priority: High   





(2) Uterine fibroid


Assessment and Plan: 


Patient with known hx of Uterine Fibroids w/ heavy bleeding


Chart review shows patient was to have a hysterectomy with OB/GYN Dr. Andrew Rocha - will follow-up


Vaginal ultrasound cancelled 3/28/18 due to patient becoming unresponsive





Imaging:


Pelvic/Transvaginal US 9/2017: Uterus/cervix:  Transvaginally the endometrial 

stripe measures 1.7 CM.  A fibroid in the anterior mid uterus measures 2.7 x 

2.5 x 2.7 CM. A second fibroid in the posterior mid uterus measures 1.7 x 2.0 x 

1.7 CM.  


Status: Acute   Priority: High   





(3) Abdominal pain


Assessment and Plan: 


Epigastric tenderness for 2 days PTA - took motrin at home (unknown quantity) w/

o relief


Stool Guiac POSITIVE, denies black or bloody stools


* Possible contamination as patient currently on her period


* Repeat Stool Occult Test


GI consult, Dr Werner. Help appreciated.


* See EGD results and GI plan shown above.





Labs/Diagnostics:


Lipase NORMAL, AST/ALT UPTRENDING


HCG NEGATIVE


Non-drinker, UDS NEGATIVE





Meds:


Protonix 40mg IVP Q12H 


Restarted diet - Heart Healthy





Imaging:


Pelvic/Abd CT w/ IV contrast 9/2017: 1. Moderate to large amount retained 

stool. Correlate for constipation. 2. Small amount of free fluid in the pelvis. 


Status: Acute   Priority: High   





(4) Prophylactic measure


Assessment and Plan: 


Protonix 40mg IVP Q12H


SCDs


NPO for now


AO contraindicated 2/2 to anemia


Status: Acute   Priority: High   





Disposition: She will need to follow-up with her outpatient OBGYN Dr. Andrew Rocha regarding her previously planned but unperformed hysterectomy. She 

will need to be discharged with PPI and Feosol tablets.








Assessment and Plan


(1) Symptomatic anemia


Status: Acute   





(2) Uterine fibroid


Status: Acute   





(3) Abdominal pain


Status: Acute   





(4) Prophylactic measure


Status: Acute

## 2018-03-29 NOTE — CP.PCM.PN
Subjective





- Date & Time of Evaluation


Date of Evaluation: 03/29/18


Time of Evaluation: 14:21





- Subjective


Subjective: 





Patient seen and examined, no acute events overnight.  She is seen resting in 

bed comfortably.  She endorses mild epigastric abdominal pain but otherwise 

denies nausea, vomiting, diarrhea, fever/chills.  Tolerating PO diet without 

difficulty.





Objective





- Vital Signs/Intake and Output


Vital Signs (last 24 hours): 


 











Temp Pulse Resp BP Pulse Ox


 


 97.9 F   82   17   145/77   98 


 


 03/29/18 13:54  03/29/18 13:54  03/29/18 13:54  03/29/18 13:54  03/29/18 13:54








Intake and Output: 


 











 03/29/18 03/29/18





 06:59 18:59


 


Intake Total 1855 


 


Balance 1855 














- Medications


Medications: 


 Current Medications





Pantoprazole Sodium (Protonix Inj)  40 mg IVP Q12H AMMON


   Last Admin: 03/29/18 08:23 Dose:  40 mg











- Labs


Labs: 


 





 03/29/18 07:13 





 03/29/18 07:13 





 











PT  13.0 SECONDS (9.7-12.2)  H  03/28/18  08:38    


 


INR  1.1   03/28/18  08:38    


 


APTT  25 SECONDS (21-34)   03/28/18  08:38    














Assessment and Plan





- Assessment and Plan (Free Text)


Assessment: 





Abdominal pain


Iron deficiency anemia


Syncope


s/p EGD showing gastritis, antral erosions.  No evidence of active or recent 

bleeding on examination.


Plan: 





- Advance diet as tolerated


- Continue with PPI therapy


- Await EGD biopsy results


- Follow up GYN recommendations


- No further planned inpatient GI intervention, will sign off case.  Please 

reconsult as necessary, thank you.

## 2018-04-25 ENCOUNTER — HOSPITAL ENCOUNTER (EMERGENCY)
Dept: HOSPITAL 31 - C.ER | Age: 38
Discharge: LEFT BEFORE DISCHARGE | End: 2018-04-25
Payer: SELF-PAY

## 2018-04-25 VITALS
SYSTOLIC BLOOD PRESSURE: 96 MMHG | DIASTOLIC BLOOD PRESSURE: 55 MMHG | OXYGEN SATURATION: 100 % | RESPIRATION RATE: 16 BRPM | HEART RATE: 72 BPM

## 2018-04-25 VITALS — TEMPERATURE: 99 F

## 2018-04-25 VITALS — BODY MASS INDEX: 26.9 KG/M2

## 2018-04-25 DIAGNOSIS — N93.9: ICD-10-CM

## 2018-04-25 DIAGNOSIS — R42: Primary | ICD-10-CM

## 2018-04-25 LAB
ALBUMIN SERPL-MCNC: 4.4 G/DL (ref 3.5–5)
ALBUMIN/GLOB SERPL: 1.2 {RATIO} (ref 1–2.1)
ALT SERPL-CCNC: 19 U/L (ref 9–52)
AST SERPL-CCNC: 26 U/L (ref 14–36)
BACTERIA #/AREA URNS HPF: (no result) /[HPF]
BASOPHILS # BLD AUTO: 0.1 K/UL (ref 0–0.2)
BASOPHILS NFR BLD: 1.2 % (ref 0–2)
BILIRUB UR-MCNC: NEGATIVE MG/DL
BUN SERPL-MCNC: 7 MG/DL (ref 7–17)
CALCIUM SERPL-MCNC: 9.2 MG/DL (ref 8.6–10.4)
EOSINOPHIL # BLD AUTO: 0.1 K/UL (ref 0–0.7)
EOSINOPHIL NFR BLD: 1.5 % (ref 0–4)
ERYTHROCYTE [DISTWIDTH] IN BLOOD BY AUTOMATED COUNT: 29.4 % (ref 11.5–14.5)
ERYTHROCYTE [DISTWIDTH] IN BLOOD BY AUTOMATED COUNT: 29.4 % (ref 11.5–14.5)
GFR NON-AFRICAN AMERICAN: > 60
GLUCOSE UR STRIP-MCNC: NORMAL MG/DL
HCG,QUALITATIVE URINE: NEGATIVE
HGB BLD-MCNC: 11.2 G/DL (ref 11–16)
HGB BLD-MCNC: 9 G/DL (ref 11–16)
LEUKOCYTE ESTERASE UR-ACNC: (no result) LEU/UL
LYMPHOCYTES # BLD AUTO: 1.6 K/UL (ref 1–4.3)
LYMPHOCYTES NFR BLD AUTO: 31.9 % (ref 20–40)
MCH RBC QN AUTO: 24.7 PG (ref 27–31)
MCH RBC QN AUTO: 24.9 PG (ref 27–31)
MCHC RBC AUTO-ENTMCNC: 32.4 G/DL (ref 33–37)
MCHC RBC AUTO-ENTMCNC: 32.7 G/DL (ref 33–37)
MCV RBC AUTO: 76 FL (ref 81–99)
MCV RBC AUTO: 76.1 FL (ref 81–99)
MONOCYTES # BLD: 0.3 K/UL (ref 0–0.8)
MONOCYTES NFR BLD: 5.2 % (ref 0–10)
NEUTROPHILS # BLD: 3.1 K/UL (ref 1.8–7)
NEUTROPHILS NFR BLD AUTO: 60.2 % (ref 50–75)
NRBC BLD AUTO-RTO: 0 % (ref 0–2)
PH UR STRIP: 5 [PH] (ref 5–8)
PLATELET # BLD: 275 K/UL (ref 130–400)
PLATELET # BLD: 346 K/UL (ref 130–400)
PMV BLD AUTO: 8.2 FL (ref 7.2–11.7)
PMV BLD AUTO: 8.7 FL (ref 7.2–11.7)
PROT UR STRIP-MCNC: (no result) MG/DL
RBC # BLD AUTO: 3.66 MIL/UL (ref 3.8–5.2)
RBC # BLD AUTO: 4.49 MIL/UL (ref 3.8–5.2)
RBC # UR STRIP: (no result) /UL
SP GR UR STRIP: 1.01 (ref 1–1.03)
SQUAMOUS EPITHIAL: 28 /HPF (ref 0–5)
UROBILINOGEN UR-MCNC: NORMAL MG/DL (ref 0.2–1)
WBC # BLD AUTO: 5.1 K/UL (ref 4.8–10.8)
WBC # BLD AUTO: 5.1 K/UL (ref 4.8–10.8)

## 2018-04-25 PROCEDURE — 84703 CHORIONIC GONADOTROPIN ASSAY: CPT

## 2018-04-25 PROCEDURE — 85027 COMPLETE CBC AUTOMATED: CPT

## 2018-04-25 PROCEDURE — 81001 URINALYSIS AUTO W/SCOPE: CPT

## 2018-04-25 PROCEDURE — 96361 HYDRATE IV INFUSION ADD-ON: CPT

## 2018-04-25 PROCEDURE — 99285 EMERGENCY DEPT VISIT HI MDM: CPT

## 2018-04-25 PROCEDURE — 85025 COMPLETE CBC W/AUTO DIFF WBC: CPT

## 2018-04-25 PROCEDURE — 80053 COMPREHEN METABOLIC PANEL: CPT

## 2018-04-25 PROCEDURE — 96360 HYDRATION IV INFUSION INIT: CPT

## 2018-04-25 NOTE — C.PDOC
History Of Present Illness


37-year-old female, PMHx includes Anemia, presents to the emergency department 

with complaints feeling weak and light headed for the past three days. Patient 

notes she developed shortness of breath today. Patient started menses  and 

was bleeding heavy for five days, and today light. Patient also complains of 

associated nausea. She states last time she felt this way needed blood 

transfusion. Denies fever or chills.


Time Seen by Provider: 18 17:26


Chief Complaint (Nursing): Dizziness/Lightheaded


History Per: Patient


History/Exam Limitations: no limitations


Current Symptoms Are (Timing): Still Present





Past Medical History


Reviewed: Historical Data, Nursing Documentation, Vital Signs


Vital Signs: 


 Last Vital Signs











Temp  99 F   18 17:17


 


Pulse  81   18 17:17


 


Resp  18   18 17:17


 


BP  108/58 L  18 17:17


 


Pulse Ox  98   18 18:26














- Medical History


PMH: Anemia, Depression, Migraine


Surgical History: Appendectomy, Cholecystectomy





- [facility] Procedures








EXCISION OF DUODENUM, ENDO, DIAGN (18)


EXCISION OF STOMACH, ENDO, DIAGN (18)


TRANSFUSE NONAUT RED BLOOD CELLS IN PERIPH VEIN, PERC (18)








Family History: States: No Known Family Hx





- Social History


Hx Alcohol Use: No


Hx Substance Use: No





- Immunization History


Hx Tetanus Toxoid Vaccination: No


Hx Influenza Vaccination: No


Hx Pneumococcal Vaccination: No





Review Of Systems


Constitutional: Positive for: Weakness.  Negative for: Fever


Cardiovascular: Positive for: Light Headedness (3 days).  Negative for: Chest 

Pain


Respiratory: Positive for: Shortness of Breath


Gastrointestinal: Negative for: Vomiting, Abdominal Pain


Musculoskeletal: Negative for: Back Pain





Physical Exam





- Physical Exam


Appears: No Acute Distress


Skin: Warm, Dry, Pale, No Rash


Head: Atraumatic, Normacephalic


Eye(s): bilateral: Normal Inspection, PERRL, EOMI


Nose: Normal


Oral Mucosa: Moist


Lips: Normal Appearing


Neck: Normal ROM


Chest: Symmetrical


Cardiovascular: Rhythm Regular, No Murmur


Respiratory: Normal Breath Sounds, No Accessory Muscle Use


Gastrointestinal/Abdominal: Soft, Tenderness (mild, suprapubic), No Guarding, 

No Rebound


Extremity: Normal ROM, No Deformity, No Swelling


Neurological/Psych: Oriented x3, Normal Speech


Gait: Unable To Assess





ED Course And Treatment





- Laboratory Results


Result Diagrams: 


 18 18:34





 18 17:37


Lab Interpretation: No Acute Changes


ECG: Interpreted By Me, Viewed By Me


ECG Rhythm: Sinus Rhythm


ECG Interpretation: No Acute Changes


Rate From EC


O2 Sat by Pulse Oximetry: 98 (RA)


Pulse Ox Interpretation: Normal





Medical Decision Making


Medical Decision Making: 


Impression: vaginal bleeding


Plan:


* EKG


* Bloodwork


* Meclizine, IVFs


* UA


Progress:


Labs reviewed, Hgb is 11. PAtinet continues to feel dizzy and orthostatics are 

low. Will order additional fluids and repeat CBC


1858 Hgb is 9. Patient is still receiving IV fluids. 


Case to be signed out to Dr Gao pending IV infusion completion, re-eval 

and dispo.





Disposition





- Disposition


Disposition Time: 19:01


Condition: STABLE





- POA


Present On Arrival: None





- Clinical Impression


Clinical Impression: 


 Dizziness, Vaginal bleeding








- Scribe Statement


The provider has reviewed the documentation as recorded by the Scribe (Tony Varela)








All medical record entries made by the Scribe were at my direction and 

personally dictated by me. I have reviewed the chart and agree that the record 

accurately reflects my personal performance of the history, physical exam, 

medical decision making, and the department course for this patient. I have 

also personally directed, reviewed, and agree with the discharge instructions 

and disposition.

## 2018-04-27 NOTE — CARD
--------------- APPROVED REPORT --------------





EKG Measurement

Heart [id number]

MO [id number]

[id number]

[id number]

[id number]



<Conclusion>

Normal sinus rhythm

Nonspecific ST abnormality

Abnormal ECG

## 2018-06-07 ENCOUNTER — HOSPITAL ENCOUNTER (INPATIENT)
Dept: HOSPITAL 31 - C.ER | Age: 38
LOS: 5 days | Discharge: HOME | DRG: 690 | End: 2018-06-12
Attending: FAMILY MEDICINE | Admitting: FAMILY MEDICINE
Payer: SELF-PAY

## 2018-06-07 VITALS — BODY MASS INDEX: 24.5 KG/M2

## 2018-06-07 DIAGNOSIS — D64.9: ICD-10-CM

## 2018-06-07 DIAGNOSIS — B96.20: ICD-10-CM

## 2018-06-07 DIAGNOSIS — K21.9: ICD-10-CM

## 2018-06-07 DIAGNOSIS — Z90.49: ICD-10-CM

## 2018-06-07 DIAGNOSIS — D25.9: ICD-10-CM

## 2018-06-07 DIAGNOSIS — N12: Primary | ICD-10-CM

## 2018-06-07 DIAGNOSIS — K59.00: ICD-10-CM

## 2018-06-07 DIAGNOSIS — Z87.440: ICD-10-CM

## 2018-06-07 DIAGNOSIS — Z87.442: ICD-10-CM

## 2018-06-07 LAB
ALBUMIN SERPL-MCNC: 4.1 G/DL (ref 3.5–5)
ALBUMIN/GLOB SERPL: 1.1 {RATIO} (ref 1–2.1)
ALT SERPL-CCNC: 24 U/L (ref 9–52)
ARTERIAL BLOOD GAS O2 SAT: 99.3 % (ref 95–98)
ARTERIAL BLOOD GAS PCO2: 31 MM/HG (ref 35–45)
ARTERIAL BLOOD GAS TCO2: 22.1 MMOL/L (ref 22–28)
ARTERIAL PATENCY WRIST A: (no result)
AST SERPL-CCNC: 23 U/L (ref 14–36)
BACTERIA #/AREA URNS HPF: (no result) /[HPF]
BASOPHILS # BLD AUTO: 0 K/UL (ref 0–0.2)
BASOPHILS NFR BLD: 0.5 % (ref 0–2)
BILIRUB UR-MCNC: NEGATIVE MG/DL
BUN SERPL-MCNC: 13 MG/DL (ref 7–17)
CALCIUM SERPL-MCNC: 9.1 MG/DL (ref 8.6–10.4)
EOSINOPHIL # BLD AUTO: 0.1 K/UL (ref 0–0.7)
EOSINOPHIL NFR BLD: 0.8 % (ref 0–4)
EOSINOPHIL NFR BLD: 2 % (ref 0–4)
ERYTHROCYTE [DISTWIDTH] IN BLOOD BY AUTOMATED COUNT: 21.8 % (ref 11.5–14.5)
GFR NON-AFRICAN AMERICAN: > 60
GLUCOSE UR STRIP-MCNC: NORMAL MG/DL
HCO3 BLDA-SCNC: 23.3 MMOL/L (ref 21–28)
HGB BLD-MCNC: 10.9 G/DL (ref 11–16)
INHALED O2 CONCENTRATION: 21 %
LEUKOCYTE ESTERASE UR-ACNC: (no result) LEU/UL
LIPASE: 142 U/L (ref 23–300)
LYMPHOCYTE: 11 % (ref 20–40)
LYMPHOCYTES # BLD AUTO: 0.8 K/UL (ref 1–4.3)
LYMPHOCYTES NFR BLD AUTO: 9 % (ref 20–40)
MCH RBC QN AUTO: 26.3 PG (ref 27–31)
MCHC RBC AUTO-ENTMCNC: 33.2 G/DL (ref 33–37)
MCV RBC AUTO: 79.4 FL (ref 81–99)
MICROCYTES BLD QL SMEAR: SLIGHT
MONOCYTE: 3 % (ref 0–10)
MONOCYTES # BLD: 0.3 K/UL (ref 0–0.8)
MONOCYTES NFR BLD: 3.5 % (ref 0–10)
NEUTROPHILS # BLD: 7.8 K/UL (ref 1.8–7)
NEUTROPHILS NFR BLD AUTO: 83 % (ref 50–75)
NEUTROPHILS NFR BLD AUTO: 86.2 % (ref 50–75)
NEUTS BAND NFR BLD: 1 % (ref 0–2)
NRBC BLD AUTO-RTO: 0 % (ref 0–2)
PH BLDA: 7.44 [PH] (ref 7.35–7.45)
PH UR STRIP: 7 [PH] (ref 5–8)
PLATELET # BLD EST: NORMAL 10*3/UL
PLATELET # BLD: 257 K/UL (ref 130–400)
PMV BLD AUTO: 8.4 FL (ref 7.2–11.7)
PO2 BLDA: 106 MM/HG (ref 80–100)
PROT UR STRIP-MCNC: NEGATIVE MG/DL
RBC # BLD AUTO: 4.13 MIL/UL (ref 3.8–5.2)
RBC # UR STRIP: (no result) /UL
SP GR UR STRIP: 1.01 (ref 1–1.03)
SQUAMOUS EPITHIAL: 2 /HPF (ref 0–5)
TOTAL CELLS COUNTED BLD: 100
UROBILINOGEN UR-MCNC: NORMAL MG/DL (ref 0.2–1)
WBC # BLD AUTO: 9 K/UL (ref 4.8–10.8)

## 2018-06-07 NOTE — C.PDOC
History Of Present Illness





Patient is a 39 y/o female, with a Hx of anemia, cholecystectomy, appendectomy, 

and , who presents to the ED with mother complaining of gradually 

worsening suprapubic pain that radiates to bilateral flanks for the past 1 

week. Patient notes subjective fever with Tmax 103. Admits to one episode of 

vomiting today, non-bloody and non-bilious. Denies taking any medications. No 

other physical complaints at this time. 


Time Seen by Provider: 18 20:11


Chief Complaint (Nursing): Abdominal Pain


History Per: Patient


History/Exam Limitations: no limitations


Onset/Duration Of Symptoms: Days (1 week), Gradual, Worse Since


Current Symptoms Are (Timing): Still Present


Location Of Pain/Discomfort: Suprapubic


Radiation Of Pain To:: Flank (bilateral)


Associated Symptoms: Fever (Tma 103), Vomiting (x1)


Recent travel outside of the [place]: No





Past Medical History


Reviewed: Historical Data, Nursing Documentation, Vital Signs


Vital Signs: 


 Last Vital Signs











Temp  100.4 F H  18 20:41


 


Pulse  88   18 20:41


 


Resp  18   18 20:41


 


BP  108/55 L  18 20:41


 


Pulse Ox  98   18 21:06














- Medical History


PMH: Anemia, Depression, Migraine


   Denies: Diabetes, Hepatitis, HIV, HTN, Chronic Kidney Disease, Seizures, 

Sexually Transmitted Disease


Surgical History: Appendectomy, Cholecystectomy, 





- [facility] Procedures








EXCISION OF DUODENUM, ENDO, DIAGN (18)


EXCISION OF STOMACH, ENDO, DIAGN (18)


TRANSFUSE NONAUT RED BLOOD CELLS IN PERIPH VEIN, PERC (18)








Family History: States: No Known Family Hx





- Social History


Hx Tobacco Use: No


Hx Alcohol Use: No


Hx Substance Use: No





- Immunization History


Hx Tetanus Toxoid Vaccination: No


Hx Influenza Vaccination: No


Hx Pneumococcal Vaccination: No





Review Of Systems


Except As Marked, All Systems Reviewed And Found Negative.


Constitutional: Positive for: Fever


Gastrointestinal: Positive for: Vomiting, Abdominal Pain (suprapubic).  

Negative for: Diarrhea, Constipation





Physical Exam





- Physical Exam


Additional Physical Exam Comments: 





   Constitutional: No acute distress.


   Head: Normocephalic. Atraumatic.


   Eyes: PERRL.


   ENT: Moist mucous membranes.


   Neck: Supple.


   Cardiovascular: Regular rate. Radial pulse 2+ bilaterally.


   Chest: No tenderness.


   Respiratory: Clear to auscultation bilaterally.


   GI: Soft. Suprapubic tenderness. Nondistended.


   Back: Bilateral CVA tenderness.


   Musculoskeletal: No tenderness or swelling of extremities.


   Skin: No rash.


   Neurologic: Alert, no focal deficit.





ED Course And Treatment





- Laboratory Results


Result Diagrams: 


 18 20:26





 18 20:26


O2 Sat by Pulse Oximetry: 98


Progress Note: Blood work, UA, and CBC ordered. Toradol, zofran, and IV fluids 

administered.





Medical Decision Making


Medical Decision Making: 


UTI, no leukocytosis or bandemia. Vital signs normal. Will discharge, PO 

antibiotics, Zofran, f/u PMD, return for intractible vomiting, worsening pain, 

fever, dyspnea, or any other problem.





Disposition





- Disposition


Disposition: HOME/ ROUTINE


Disposition Time: 21:06


Condition: STABLE


Prescriptions: 


Ciprofloxacin [Cipro] 500 mg PO BID #14 tab


Ibuprofen [Motrin] 600 mg PO Q6 #25 tab


Ondansetron ODT [Zofran ODT] 4 mg PO Q8 #12 odt


Phenazopyridine [Pyridium] 1 tab PO Q8 #6 tab


Instructions:  Urinary Tract Infections in Adults


Forms:  [facility] (English)





- Clinical Impression


Clinical Impression: 


 UTI (urinary tract infection)








- Scribe Statement


The provider has reviewed the documentation as recorded by the Scribe





Eliza Carson





All medical record entries made by the Scribe were at my direction and 

personally dictated by me. I have reviewed the chart and agree that the record 

accurately reflects my personal performance of the history, physical exam, 

medical decision making, and the department course for this patient. I have 

also personally directed, reviewed, and agree with the discharge instructions 

and disposition.

## 2018-06-08 VITALS — RESPIRATION RATE: 20 BRPM

## 2018-06-08 LAB
% IRON SATURATION: 6 (ref 20–55)
ALBUMIN SERPL-MCNC: 3.4 G/DL (ref 3.5–5)
ALBUMIN/GLOB SERPL: 1.1 {RATIO} (ref 1–2.1)
ALT SERPL-CCNC: 275 U/L (ref 9–52)
APTT BLD: 28 SECONDS (ref 21–34)
AST SERPL-CCNC: 417 U/L (ref 14–36)
BASOPHILS # BLD AUTO: 0 K/UL (ref 0–0.2)
BASOPHILS NFR BLD: 0.4 % (ref 0–2)
BUN SERPL-MCNC: 9 MG/DL (ref 7–17)
CALCIUM SERPL-MCNC: 8.4 MG/DL (ref 8.6–10.4)
EOSINOPHIL # BLD AUTO: 0 K/UL (ref 0–0.7)
EOSINOPHIL NFR BLD: 0.2 % (ref 0–4)
ERYTHROCYTE [DISTWIDTH] IN BLOOD BY AUTOMATED COUNT: 21.7 % (ref 11.5–14.5)
FERRITIN SERPL-MCNC: 33.4 NG/ML
FOLATE SERPL-MCNC: 12.6 NG/ML
GFR NON-AFRICAN AMERICAN: > 60
HGB BLD-MCNC: 9.2 G/DL (ref 11–16)
INR PPP: 1.1
IRON SERPL-MCNC: 24 UG/DL (ref 37–170)
LYMPHOCYTES # BLD AUTO: 1.1 K/UL (ref 1–4.3)
LYMPHOCYTES NFR BLD AUTO: 12.4 % (ref 20–40)
MCH RBC QN AUTO: 26.4 PG (ref 27–31)
MCHC RBC AUTO-ENTMCNC: 33 G/DL (ref 33–37)
MCV RBC AUTO: 79.9 FL (ref 81–99)
MONOCYTES # BLD: 0.7 K/UL (ref 0–0.8)
MONOCYTES NFR BLD: 8.3 % (ref 0–10)
NEUTROPHILS # BLD: 6.8 K/UL (ref 1.8–7)
NEUTROPHILS NFR BLD AUTO: 78.7 % (ref 50–75)
NRBC BLD AUTO-RTO: 0.1 % (ref 0–2)
PLATELET # BLD: 210 K/UL (ref 130–400)
PMV BLD AUTO: 8.6 FL (ref 7.2–11.7)
PROTHROMBIN TIME: 11.9 SECONDS (ref 9.7–12.2)
RBC # BLD AUTO: 3.48 MIL/UL (ref 3.8–5.2)
TIBC SERPL-MCNC: 412 UG/DL (ref 250–450)
VIT B12 SERPL-MCNC: 474 PG/ML (ref 239–931)
WBC # BLD AUTO: 8.6 K/UL (ref 4.8–10.8)

## 2018-06-08 RX ADMIN — TRAZODONE HYDROCHLORIDE SCH MG: 50 TABLET ORAL at 22:40

## 2018-06-08 RX ADMIN — Medication SCH MG: at 17:24

## 2018-06-08 NOTE — CP.PCM.CON
Past Patient History





- Infectious Disease


Hx of Infectious Diseases: None





- Past Social History


Smoking Status: Never Smoked


Chewing Tobacco Use: No


Cigar Use: No


Alcohol: None


Domestic Violence: Negative





- CARDIAC


Hx Hypertension: No





- PULMONARY


Hx Tuberculosis: No





- NEUROLOGICAL


Hx Migraine: Yes


Hx Seizures: No





- HEENT


Hx HEENT Problems: No





- RENAL


Hx Chronic Kidney Disease: No





- ENDOCRINE/METABOLIC


Other/Comment: "low blood sugar"





- HEMATOLOGICAL/ONCOLOGICAL


Hx Anemia: Yes


Hx Human Immunodeficiency Virus (HIV): No





- INTEGUMENTARY


Hx Dermatological Problems: No





- MUSCULOSKELETAL/RHEUMATOLOGICAL


Hx Falls: No





- GASTROINTESTINAL


Hx Gastrointestinal Disorders: No





- GENITOURINARY/GYNECOLOGICAL


Hx Sexually Transmitted Disorders: No





- PSYCHIATRIC


Hx Depression: Yes


Hx Substance Use: No





- SURGICAL HISTORY


Hx Appendectomy: Yes


Hx Cholecystectomy: Yes





- ANESTHESIA


Hx Anesthesia: Yes


Hx Anesthesia Reactions: No





Meds


Home Medications: 


 Home Medication List











 Medication  Instructions  Recorded  Confirmed  Type


 


Ciprofloxacin [Cipro] 500 mg PO BID #14 tab 06/07/18  Rx


 


Ibuprofen [Motrin] 600 mg PO Q6 #25 tab 06/07/18  Rx


 


Ondansetron ODT [Zofran ODT] 4 mg PO Q8 #12 odt 06/07/18  Rx


 


Phenazopyridine [Pyridium] 1 tab PO Q8 #6 tab 06/07/18  Rx











Allergies/Adverse Reactions: 


 Allergies











Allergy/AdvReac Type Severity Reaction Status Date / Time


 


No Known Allergies Allergy   Verified 06/07/18 19:52














- Medications


Medications: 


 Current Medications





Fluoxetine HCl (Prozac)  10 mg PO DAILY [facility]


Hydromorphone HCl (Dilaudid)  1 mg IVP Q4H PRN


   PRN Reason: Pain, severe (8-10)


Lactated Ringer's (Lactated Ringer's)  1,000 mls @ 150 mls/hr IV .Q6H40M [facility]


   Last Admin: 06/08/18 15:39 Dose:  Not Given


Cefepime HCl (Maxipime Iv 1 Gm Premix)  1 gm in 50 mls @ 100 mls/hr IVPB Q12H 

[facility]


   PRN Reason: Protocol


   Last Admin: 06/08/18 14:00 Dose:  100 mls/hr


Sodium Chloride (Sodium Chloride 0.9%)  1,000 mls @ 1,000 mls/hr IV .Q1H ONE


   Stop: 06/08/18 15:53


   Last Admin: 06/08/18 15:06 Dose:  1,000 mls/hr


Morphine Sulfate (Morphine)  2 mg IV Q4H [facility]


   Last Admin: 06/08/18 14:28 Dose:  2 mg


Ondansetron HCl (Zofran Inj)  4 mg IVP Q6H PRN


   PRN Reason: Nausea/Vomiting


Pneumococcal Polyvalent Vaccine (Pneumovax 23 Vaccine)  0.5 ml IM .ONCE ONE


   Stop: 06/10/18 10:01


Saccharomyces Boulardii (Florastor)  250 mg PO BID [facility]


Trazodone HCl (Desyrel)  25 mg PO HS [facility]











Results





- Vital Signs


Recent Vital Signs: 


 Last Vital Signs











Temp  98.3 F   06/08/18 07:10


 


Pulse  64   06/08/18 07:10


 


Resp  20   06/08/18 07:10


 


BP  94/53 L  06/08/18 14:18


 


Pulse Ox  100   06/08/18 07:10














- Labs


Result Diagrams: 


 06/08/18 14:57





 06/08/18 14:57


Labs: 


 Laboratory Results - last 24 hr











  06/07/18 06/07/18 06/07/18





  20:20 20:26 20:26


 


WBC   9.0  D 


 


RBC   4.13 


 


Hgb   10.9 L 


 


Hct   32.8 L 


 


MCV   79.4 L 


 


MCH   26.3 L 


 


MCHC   33.2 


 


RDW   21.8 H 


 


Plt Count   257 


 


MPV   8.4 


 


Neut % (Auto)   86.2 H 


 


Lymph % (Auto)   9.0 L 


 


Mono % (Auto)   3.5 


 


Eos % (Auto)   0.8 


 


Baso % (Auto)   0.5 


 


Neut # (Auto)   7.8 H 


 


Lymph # (Auto)   0.8 L 


 


Mono # (Auto)   0.3 


 


Eos # (Auto)   0.1 


 


Baso # (Auto)   0.0 


 


Neutrophils % (Manual)   83 H 


 


Band Neutrophils %   1 


 


Lymphocytes % (Manual)   11 L 


 


Monocytes % (Manual)   3 


 


Eosinophils % (Manual)   2 


 


Platelet Estimate   Normal 


 


Microcytosis (manual)   Slight 


 


PT   


 


INR   


 


APTT   


 


Puncture Site   


 


pCO2   


 


pO2   


 


HCO3   


 


ABG pH   


 


ABG Total CO2   


 


ABG O2 Saturation   


 


ABG Base Excess   


 


Natalio Test   


 


ABG Potassium   


 


A-a O2 Difference   


 


Respiratory Index   


 


Glucose   


 


Lactate   


 


Liter Flow   


 


FiO2   


 


Sodium    137


 


Potassium    3.9


 


Chloride    100


 


Carbon Dioxide    25


 


Anion Gap    16


 


BUN    13


 


Creatinine    0.6 L


 


Est GFR ( Amer)    > 60


 


Est GFR (Non-Af Amer)    > 60


 


Random Glucose    91


 


Calcium    9.1


 


Total Bilirubin    0.7


 


AST    23


 


ALT    24


 


Alkaline Phosphatase    107


 


Total Protein    7.8


 


Albumin    4.1


 


Globulin    3.7


 


Albumin/Globulin Ratio    1.1


 


Lipase    142


 


Arterial Blood Potassium   


 


Urine Color   


 


Urine Clarity   


 


Urine pH   


 


Ur Specific Gravity   


 


Urine Protein   


 


Urine Glucose (UA)   


 


Urine Ketones   


 


Urine Blood   


 


Urine Nitrate   


 


Urine Bilirubin   


 


Urine Urobilinogen   


 


Ur Leukocyte Esterase   


 


Urine WBC (Auto)   


 


Urine RBC (Auto)   


 


Ur Squamous Epith Cells   


 


Urine Bacteria   


 


Urine HCG, Qual  Negative  














  06/07/18 06/07/18 06/08/18





  20:43 21:57 00:55


 


WBC   


 


RBC   


 


Hgb   


 


Hct   


 


MCV   


 


MCH   


 


MCHC   


 


RDW   


 


Plt Count   


 


MPV   


 


Neut % (Auto)   


 


Lymph % (Auto)   


 


Mono % (Auto)   


 


Eos % (Auto)   


 


Baso % (Auto)   


 


Neut # (Auto)   


 


Lymph # (Auto)   


 


Mono # (Auto)   


 


Eos # (Auto)   


 


Baso # (Auto)   


 


Neutrophils % (Manual)   


 


Band Neutrophils %   


 


Lymphocytes % (Manual)   


 


Monocytes % (Manual)   


 


Eosinophils % (Manual)   


 


Platelet Estimate   


 


Microcytosis (manual)   


 


PT    11.9


 


INR    1.1


 


APTT    28


 


Puncture Site   Rba 


 


pCO2   31 L 


 


pO2   106 H 


 


HCO3   23.3 


 


ABG pH   7.44 


 


ABG Total CO2   22.1 


 


ABG O2 Saturation   99.3 H 


 


ABG Base Excess   -2.1 L 


 


Natalio Test   Pos 


 


ABG Potassium   3.2 L 


 


A-a O2 Difference   5.0 


 


Respiratory Index   0 


 


Glucose   91 


 


Lactate   0.6 L 


 


Liter Flow   0 


 


FiO2   21.0 


 


Sodium   135.0 


 


Potassium   


 


Chloride   107.0 


 


Carbon Dioxide   


 


Anion Gap   


 


BUN   


 


Creatinine   


 


Est GFR ( Amer)   


 


Est GFR (Non-Af Amer)   


 


Random Glucose   


 


Calcium   


 


Total Bilirubin   


 


AST   


 


ALT   


 


Alkaline Phosphatase   


 


Total Protein   


 


Albumin   


 


Globulin   


 


Albumin/Globulin Ratio   


 


Lipase   


 


Arterial Blood Potassium   3.2 L 


 


Urine Color  Yellow  


 


Urine Clarity  Hazy  


 


Urine pH  7.0  


 


Ur Specific Gravity  1.012  


 


Urine Protein  Negative  


 


Urine Glucose (UA)  Normal  


 


Urine Ketones  Negative  


 


Urine Blood  2+ H  


 


Urine Nitrate  Positive H  


 


Urine Bilirubin  Negative  


 


Urine Urobilinogen  Normal  


 


Ur Leukocyte Esterase  2+ H  


 


Urine WBC (Auto)  114 H  


 


Urine RBC (Auto)  4 H  


 


Ur Squamous Epith Cells  2  


 


Urine Bacteria  Mod H  


 


Urine HCG, Qual   














  06/08/18 06/08/18





  14:57 14:57


 


WBC  8.6 


 


RBC  3.48 L 


 


Hgb  9.2 L 


 


Hct  27.8 L 


 


MCV  79.9 L 


 


MCH  26.4 L 


 


MCHC  33.0 


 


RDW  21.7 H 


 


Plt Count  210 


 


MPV  8.6 


 


Neut % (Auto)  78.7 H 


 


Lymph % (Auto)  12.4 L 


 


Mono % (Auto)  8.3 


 


Eos % (Auto)  0.2 


 


Baso % (Auto)  0.4 


 


Neut # (Auto)  6.8 


 


Lymph # (Auto)  1.1 


 


Mono # (Auto)  0.7 


 


Eos # (Auto)  0.0 


 


Baso # (Auto)  0.0 


 


Neutrophils % (Manual)  


 


Band Neutrophils %  


 


Lymphocytes % (Manual)  


 


Monocytes % (Manual)  


 


Eosinophils % (Manual)  


 


Platelet Estimate  


 


Microcytosis (manual)  


 


PT  


 


INR  


 


APTT  


 


Puncture Site  


 


pCO2  


 


pO2  


 


HCO3  


 


ABG pH  


 


ABG Total CO2  


 


ABG O2 Saturation  


 


ABG Base Excess  


 


Natalio Test  


 


ABG Potassium  


 


A-a O2 Difference  


 


Respiratory Index  


 


Glucose  


 


Lactate  


 


Liter Flow  


 


FiO2  


 


Sodium   138


 


Potassium   4.0


 


Chloride   104


 


Carbon Dioxide   24


 


Anion Gap   13


 


BUN   9


 


Creatinine   0.6 L


 


Est GFR ( Amer)   > 60


 


Est GFR (Non-Af Amer)   > 60


 


Random Glucose   98


 


Calcium   8.4 L


 


Total Bilirubin   1.3


 


AST   417 H D


 


ALT   275 H D


 


Alkaline Phosphatase   92


 


Total Protein   6.6


 


Albumin   3.4 L


 


Globulin   3.2


 


Albumin/Globulin Ratio   1.1


 


Lipase  


 


Arterial Blood Potassium  


 


Urine Color  


 


Urine Clarity  


 


Urine pH  


 


Ur Specific Gravity  


 


Urine Protein  


 


Urine Glucose (UA)  


 


Urine Ketones  


 


Urine Blood  


 


Urine Nitrate  


 


Urine Bilirubin  


 


Urine Urobilinogen  


 


Ur Leukocyte Esterase  


 


Urine WBC (Auto)  


 


Urine RBC (Auto)  


 


Ur Squamous Epith Cells  


 


Urine Bacteria  


 


Urine HCG, Qual  














Assessment & Plan





- Assessment and Plan (Free Text)


Assessment: 





IMP:


UTI 


L Pyelonephritis


Hx of urolithiasis





full note t/f





YS





- Date & Time


Date: 06/08/18


Time: 15:51

## 2018-06-08 NOTE — US
Renal and urinary bladder ultrasound 



History: Urinary tract infection.  Hematuria. 



Comparison: None available. 



Technique: Real-time sonography was performed through the kidneys and 

urinary bladder. 



Findings: 



Right kidney: 11.4 x 4.3 x 5.3 centimeters. No calculi or 

hydronephrosis. 



Left Kidney: 11.2 x 5.8 x 5.5 centimeters. No calculi or 

hydronephrosis. Midpole hypoechoic cyst measuring 1.0 x 0.5 x 0.6 

centimeters. 



Visualized aorta is grossly preserved. 



Visualized urinary bladder is grossly preserved. 



Prevoid bladder volume of 255 cc. 



No significant postvoid residual in urinary bladder. 



Bilateral ureteral jets were visualized. 



Mild thickening versus underdistention of the urinary bladder.  

Clinical correlation. 



No gross bladder calculus identified. 



Impression: 



Mild thickening versus underdistention of the urinary bladder. 

Clinical correlation. 



1 centimeter left renal cyst. 



If hematuria persists, consider correlation with a hematuria protocol 

CT scan.

## 2018-06-08 NOTE — CT
PROCEDURE:  CT Abdomen and Pelvis without intravenous contrast



HISTORY:

uti symptoms, hx of kidney stones



COMPARISON:

CT scan of the abdomen and pelvis dated 09/12/2017.



TECHNIQUE:

Contiguous images were obtained from the domes of the diaphragms to 

the upper thighs without the administration of intravenous contrast. 

Oral contrast was not administered.



Radiation dose:



Total exam DLP = 355 mGy-cm.



This CT exam was performed using one or more of the following dose 

reduction techniques: Automated exposure control, adjustment of the 

mA and/or kV according to patient size, and/or use of iterative 

reconstruction technique.



FINDINGS:



LOWER THORAX:

Unremarkable. 



LIVER:

Unremarkable. No gross lesion or ductal dilatation.  



GALLBLADDER AND BILE DUCTS:

Prior cholecystectomy with surgical clips in place. 



PANCREAS:

Unremarkable. No gross lesion or ductal dilatation.



SPLEEN:

Unremarkable. 



ADRENALS:

Unremarkable. No mass. 



KIDNEYS AND URETERS:

Left perinephric stranding. No hydronephrosis. No solid mass. 



VASCULATURE:

Unremarkable. No aortic aneurysm. 



BOWEL:

Unremarkable. No obstruction. No gross mural thickening. 



APPENDIX:

Not visualized. 



PERITONEUM:

Unremarkable. No free fluid. No free air. 



LYMPH NODES:

Unremarkable. No enlarged lymph nodes. 



BLADDER:

Unremarkable. 



REPRODUCTIVE:

Uterine fibroids. 



BONES:

No acute fracture. 



OTHER FINDINGS:

None.



IMPRESSION:

No obstructive uropathy or evidence of recently passed genitourinary 

calculus.  Nonspecific left perinephric stranding for which 

pyelonephritis cannot be excluded on this unenhanced CT.

## 2018-06-08 NOTE — CP.PCM.HP
<Marcela Jaime - Last Filed: 18 00:21>





History of Present Illness





- History of Present Illness


History of Present Illness: 


CC:


38-year-old female presents to the ED for evaluation of suprapubic pain for 1 

week. Patient reports 1 week of painful and burning urination with reddish urine

, back pain, and pain in bilateral lower extremities when walking. Patient 

reports fevers and chills which started yesterday, Tmax 103. Patient denies 

taking medication for pain. Patient states this is the first episode. Patient 

reports dizziness, chest pain, shortness of breath, and cough which started 

today. Patient denies nausea/vomiting/diarrhea/constipation, numbness/

paresthesia in extremities, saddle anesthesia. LMP was May 16. Review of 

records shows patient was admitted in 2018 for symptomatic anemia 

secondary to uterine fibroids. Patient has since followed up with OBGYN who 

recommends hysterectomy but patient has not had surgery yet due to not being 

able to afford it at this time.





PMHx: fibroids, migraine, depression, chronic anemia


PSHx: 4  sections, appendectomy and as per prior charts cholecystectomy 


Home meds: Motrin as needed


Family Hx: unknown


Social Hx: Patient denies smoking, drinking or recreational drug use. Patient 

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 Chart review shows patient is 

recently . 


Allergies: NKDA


Clinic at [place]


OBGYN: Dr. Andrew Rocha





Present on Admission





- Present on Admission


Any Indicators Present on Admission: No


History of DVT/PE: No


History of Uncontrolled Diabetes: No


Urinary Catheter: No


Decubitus Ulcer Present: No





Review of Systems





- Review of Systems


All systems: reviewed and no additional remarkable complaints except





Past Patient History





- Infectious Disease


Hx of Infectious Diseases: None





- Past Social History


Smoking Status: Never Smoked


Chewing Tobacco Use: No


Cigar Use: No


Alcohol: None


Domestic Violence: Negative





- CARDIAC


Hx Hypertension: No





- PULMONARY


Hx Tuberculosis: No





- NEUROLOGICAL


Hx Migraine: Yes


Hx Seizures: No





- HEENT


Hx HEENT Problems: No





- RENAL


Hx Chronic Kidney Disease: No





- ENDOCRINE/METABOLIC


Other/Comment: "low blood sugar"





- HEMATOLOGICAL/ONCOLOGICAL


Hx Anemia: Yes


Hx Human Immunodeficiency Virus (HIV): No





- INTEGUMENTARY


Hx Dermatological Problems: No





- MUSCULOSKELETAL/RHEUMATOLOGICAL


Hx Falls: No





- GASTROINTESTINAL


Hx Gastrointestinal Disorders: No





- GENITOURINARY/GYNECOLOGICAL


Hx Sexually Transmitted Disorders: No





- PSYCHIATRIC


Hx Depression: Yes


Hx Substance Use: No





- SURGICAL HISTORY


Hx Appendectomy: Yes


Hx Cholecystectomy: Yes





- ANESTHESIA


Hx Anesthesia: Yes


Hx Anesthesia Reactions: No





Meds


Home Medications: 


 Home Medication List











 Medication  Instructions  Recorded  Confirmed  Type


 


Ciprofloxacin [Cipro] 500 mg PO BID #14 tab 18  Rx


 


Ibuprofen [Motrin] 600 mg PO Q6 #25 tab 18  Rx


 


Ondansetron ODT [Zofran ODT] 4 mg PO Q8 #12 odt 18  Rx


 


Phenazopyridine [Pyridium] 1 tab PO Q8 #6 tab 18  Rx











Allergies/Adverse Reactions: 


 Allergies











Allergy/AdvReac Type Severity Reaction Status Date / Time


 


No Known Allergies Allergy   Verified 18 19:52














Physical Exam





- Constitutional


Appears: No Acute Distress





- Head Exam


Head Exam: ATRAUMATIC, NORMAL INSPECTION, NORMOCEPHALIC


Additional comments: 





discoloration of skin on cheeks, darker brown pigmentation





- Eye Exam


Eye Exam: EOMI, Normal appearance


Pupil Exam: NORMAL ACCOMODATION, PERRL





- ENT Exam


ENT Exam: Mucous Membranes Moist, Normal Exam





- Neck Exam


Neck exam: Positive for: Normal Inspection





- Respiratory Exam


Respiratory Exam: Clear to Auscultation Bilateral, NORMAL BREATHING PATTERN.  

absent: Accessory Muscle Use, Chest Wall Tenderness





- Cardiovascular Exam


Cardiovascular Exam: REGULAR RHYTHM, +S1, +S2.  absent: Bradycardia, Tachycardia





- GI/Abdominal Exam


GI & Abdominal Exam: Guarding, Soft, Tenderness (right lower and left lower 

quadrant to deep palpation).  absent: Firm, Organomegaly, Rigid





- Extremities Exam


Extremities exam: Positive for: full ROM, normal capillary refill, normal 

inspection, pedal pulses present.  Negative for: joint swelling, pedal edema





- Neurological Exam


Neurological exam: Alert, CN II-XII Intact





- Psychiatric Exam


Psychiatric exam: Normal Affect, Normal Mood





- Skin


Skin Exam: Dry, Intact, Normal Color, Warm





Results





- Vital Signs


Recent Vital Signs: 





 Last Vital Signs











Temp  100.4 F H  18 20:41


 


Pulse  88   18 20:41


 


Resp  18   18 20:41


 


BP  108/55 L  18 20:41


 


Pulse Ox  98   18 21:22














- Labs


Result Diagrams: 


 18 20:26





 18 20:26


Labs: 





 Laboratory Results - last 24 hr











  18





  20:20 20:26 20:26


 


WBC   9.0  D 


 


RBC   4.13 


 


Hgb   10.9 L 


 


Hct   32.8 L 


 


MCV   79.4 L 


 


MCH   26.3 L 


 


MCHC   33.2 


 


RDW   21.8 H 


 


Plt Count   257 


 


MPV   8.4 


 


Neut % (Auto)   86.2 H 


 


Lymph % (Auto)   9.0 L 


 


Mono % (Auto)   3.5 


 


Eos % (Auto)   0.8 


 


Baso % (Auto)   0.5 


 


Neut # (Auto)   7.8 H 


 


Lymph # (Auto)   0.8 L 


 


Mono # (Auto)   0.3 


 


Eos # (Auto)   0.1 


 


Baso # (Auto)   0.0 


 


Neutrophils % (Manual)   83 H 


 


Band Neutrophils %   1 


 


Lymphocytes % (Manual)   11 L 


 


Monocytes % (Manual)   3 


 


Eosinophils % (Manual)   2 


 


Platelet Estimate   Normal 


 


Microcytosis (manual)   Slight 


 


Puncture Site   


 


pCO2   


 


pO2   


 


HCO3   


 


ABG pH   


 


ABG Total CO2   


 


ABG O2 Saturation   


 


ABG Base Excess   


 


Natalio Test   


 


ABG Potassium   


 


A-a O2 Difference   


 


Respiratory Index   


 


Glucose   


 


Lactate   


 


Liter Flow   


 


FiO2   


 


Sodium    137


 


Potassium    3.9


 


Chloride    100


 


Carbon Dioxide    25


 


Anion Gap    16


 


BUN    13


 


Creatinine    0.6 L


 


Est GFR ( Amer)    > 60


 


Est GFR (Non-Af Amer)    > 60


 


Random Glucose    91


 


Calcium    9.1


 


Total Bilirubin    0.7


 


AST    23


 


ALT    24


 


Alkaline Phosphatase    107


 


Total Protein    7.8


 


Albumin    4.1


 


Globulin    3.7


 


Albumin/Globulin Ratio    1.1


 


Lipase    142


 


Arterial Blood Potassium   


 


Urine Color   


 


Urine Clarity   


 


Urine pH   


 


Ur Specific Gravity   


 


Urine Protein   


 


Urine Glucose (UA)   


 


Urine Ketones   


 


Urine Blood   


 


Urine Nitrate   


 


Urine Bilirubin   


 


Urine Urobilinogen   


 


Ur Leukocyte Esterase   


 


Urine WBC (Auto)   


 


Urine RBC (Auto)   


 


Ur Squamous Epith Cells   


 


Urine Bacteria   


 


Urine HCG, Qual  Negative  














  18





  20:43 21:57


 


WBC  


 


RBC  


 


Hgb  


 


Hct  


 


MCV  


 


MCH  


 


MCHC  


 


RDW  


 


Plt Count  


 


MPV  


 


Neut % (Auto)  


 


Lymph % (Auto)  


 


Mono % (Auto)  


 


Eos % (Auto)  


 


Baso % (Auto)  


 


Neut # (Auto)  


 


Lymph # (Auto)  


 


Mono # (Auto)  


 


Eos # (Auto)  


 


Baso # (Auto)  


 


Neutrophils % (Manual)  


 


Band Neutrophils %  


 


Lymphocytes % (Manual)  


 


Monocytes % (Manual)  


 


Eosinophils % (Manual)  


 


Platelet Estimate  


 


Microcytosis (manual)  


 


Puncture Site   Rba


 


pCO2   31 L


 


pO2   106 H


 


HCO3   23.3


 


ABG pH   7.44


 


ABG Total CO2   22.1


 


ABG O2 Saturation   99.3 H


 


ABG Base Excess   -2.1 L


 


Natalio Test   Pos


 


ABG Potassium   3.2 L


 


A-a O2 Difference   5.0


 


Respiratory Index   0


 


Glucose   91


 


Lactate   0.6 L


 


Liter Flow   0


 


FiO2   21.0


 


Sodium   135.0


 


Potassium  


 


Chloride   107.0


 


Carbon Dioxide  


 


Anion Gap  


 


BUN  


 


Creatinine  


 


Est GFR ( Amer)  


 


Est GFR (Non-Af Amer)  


 


Random Glucose  


 


Calcium  


 


Total Bilirubin  


 


AST  


 


ALT  


 


Alkaline Phosphatase  


 


Total Protein  


 


Albumin  


 


Globulin  


 


Albumin/Globulin Ratio  


 


Lipase  


 


Arterial Blood Potassium   3.2 L


 


Urine Color  Yellow 


 


Urine Clarity  Hazy 


 


Urine pH  7.0 


 


Ur Specific Gravity  1.012 


 


Urine Protein  Negative 


 


Urine Glucose (UA)  Normal 


 


Urine Ketones  Negative 


 


Urine Blood  2+ H 


 


Urine Nitrate  Positive H 


 


Urine Bilirubin  Negative 


 


Urine Urobilinogen  Normal 


 


Ur Leukocyte Esterase  2+ H 


 


Urine WBC (Auto)  114 H 


 


Urine RBC (Auto)  4 H 


 


Ur Squamous Epith Cells  2 


 


Urine Bacteria  Mod H 


 


Urine HCG, Qual  














Assessment & Plan





- Assessment and Plan (Free Text)


Assessment: 





Pyelonephritis, Urosepsis without leukocytosis


on admission: fever, cva tenderness


monitor CBC, CMP


UA positive, f/u urine cx


cipro 400 mg IV Q12H


Tylenol 650 mg PO Q6H PRN fever


Toradol 30 mg IVP Q6H PRN pain


LR @150cc/hr





SOB and cough, O2 saturations % on RA


CXR


EKG





chronic anemia


monitor cbc





Prophylaxis


Heparin 5000 sc Q8H 


SCDs





discussed with Dr. Amna Jaime DO PGY1





- Date & Time


Date: 18


Time: 00:29





<Ayad Woo - Last Filed: 18 06:36>





Results





- Vital Signs


Recent Vital Signs: 





 Last Vital Signs











Temp  97.9 F   18 00:47


 


Pulse  85   18 00:47


 


Resp  20   18 00:47


 


BP  104/49 L  18 00:47


 


Pulse Ox  98   18 00:47














- Labs


Result Diagrams: 


 18 20:26





 18 20:26


Labs: 





 Laboratory Results - last 24 hr











  18





  20:20 20:26 20:26


 


WBC   9.0  D 


 


RBC   4.13 


 


Hgb   10.9 L 


 


Hct   32.8 L 


 


MCV   79.4 L 


 


MCH   26.3 L 


 


MCHC   33.2 


 


RDW   21.8 H 


 


Plt Count   257 


 


MPV   8.4 


 


Neut % (Auto)   86.2 H 


 


Lymph % (Auto)   9.0 L 


 


Mono % (Auto)   3.5 


 


Eos % (Auto)   0.8 


 


Baso % (Auto)   0.5 


 


Neut # (Auto)   7.8 H 


 


Lymph # (Auto)   0.8 L 


 


Mono # (Auto)   0.3 


 


Eos # (Auto)   0.1 


 


Baso # (Auto)   0.0 


 


Neutrophils % (Manual)   83 H 


 


Band Neutrophils %   1 


 


Lymphocytes % (Manual)   11 L 


 


Monocytes % (Manual)   3 


 


Eosinophils % (Manual)   2 


 


Platelet Estimate   Normal 


 


Microcytosis (manual)   Slight 


 


PT   


 


INR   


 


APTT   


 


Puncture Site   


 


pCO2   


 


pO2   


 


HCO3   


 


ABG pH   


 


ABG Total CO2   


 


ABG O2 Saturation   


 


ABG Base Excess   


 


Natalio Test   


 


ABG Potassium   


 


A-a O2 Difference   


 


Respiratory Index   


 


Glucose   


 


Lactate   


 


Liter Flow   


 


FiO2   


 


Sodium    137


 


Potassium    3.9


 


Chloride    100


 


Carbon Dioxide    25


 


Anion Gap    16


 


BUN    13


 


Creatinine    0.6 L


 


Est GFR ( Amer)    > 60


 


Est GFR (Non-Af Amer)    > 60


 


Random Glucose    91


 


Calcium    9.1


 


Total Bilirubin    0.7


 


AST    23


 


ALT    24


 


Alkaline Phosphatase    107


 


Total Protein    7.8


 


Albumin    4.1


 


Globulin    3.7


 


Albumin/Globulin Ratio    1.1


 


Lipase    142


 


Arterial Blood Potassium   


 


Urine Color   


 


Urine Clarity   


 


Urine pH   


 


Ur Specific Gravity   


 


Urine Protein   


 


Urine Glucose (UA)   


 


Urine Ketones   


 


Urine Blood   


 


Urine Nitrate   


 


Urine Bilirubin   


 


Urine Urobilinogen   


 


Ur Leukocyte Esterase   


 


Urine WBC (Auto)   


 


Urine RBC (Auto)   


 


Ur Squamous Epith Cells   


 


Urine Bacteria   


 


Urine HCG, Qual  Negative  














  18





  20:43 21:57 00:55


 


WBC   


 


RBC   


 


Hgb   


 


Hct   


 


MCV   


 


MCH   


 


MCHC   


 


RDW   


 


Plt Count   


 


MPV   


 


Neut % (Auto)   


 


Lymph % (Auto)   


 


Mono % (Auto)   


 


Eos % (Auto)   


 


Baso % (Auto)   


 


Neut # (Auto)   


 


Lymph # (Auto)   


 


Mono # (Auto)   


 


Eos # (Auto)   


 


Baso # (Auto)   


 


Neutrophils % (Manual)   


 


Band Neutrophils %   


 


Lymphocytes % (Manual)   


 


Monocytes % (Manual)   


 


Eosinophils % (Manual)   


 


Platelet Estimate   


 


Microcytosis (manual)   


 


PT    11.9


 


INR    1.1


 


APTT    28


 


Puncture Site   Rba 


 


pCO2   31 L 


 


pO2   106 H 


 


HCO3   23.3 


 


ABG pH   7.44 


 


ABG Total CO2   22.1 


 


ABG O2 Saturation   99.3 H 


 


ABG Base Excess   -2.1 L 


 


Natalio Test   Pos 


 


ABG Potassium   3.2 L 


 


A-a O2 Difference   5.0 


 


Respiratory Index   0 


 


Glucose   91 


 


Lactate   0.6 L 


 


Liter Flow   0 


 


FiO2   21.0 


 


Sodium   135.0 


 


Potassium   


 


Chloride   107.0 


 


Carbon Dioxide   


 


Anion Gap   


 


BUN   


 


Creatinine   


 


Est GFR ( Amer)   


 


Est GFR (Non-Af Amer)   


 


Random Glucose   


 


Calcium   


 


Total Bilirubin   


 


AST   


 


ALT   


 


Alkaline Phosphatase   


 


Total Protein   


 


Albumin   


 


Globulin   


 


Albumin/Globulin Ratio   


 


Lipase   


 


Arterial Blood Potassium   3.2 L 


 


Urine Color  Yellow  


 


Urine Clarity  Hazy  


 


Urine pH  7.0  


 


Ur Specific Gravity  1.012  


 


Urine Protein  Negative  


 


Urine Glucose (UA)  Normal  


 


Urine Ketones  Negative  


 


Urine Blood  2+ H  


 


Urine Nitrate  Positive H  


 


Urine Bilirubin  Negative  


 


Urine Urobilinogen  Normal  


 


Ur Leukocyte Esterase  2+ H  


 


Urine WBC (Auto)  114 H  


 


Urine RBC (Auto)  4 H  


 


Ur Squamous Epith Cells  2  


 


Urine Bacteria  Mod H  


 


Urine HCG, Qual   














Assessment & Plan





- Date & Time


Date: 18 (I have seen and examined the patient.  I agree with the 

findings and plan of care as documented by Dr. Jaime.  Patient with 

pyelonephritis.  SIRs criteria met in ED.  Continue cipro.  Check urine and 

blood cultures.  Monitor CBC due to history of anemia.  Monitor for acute 

changes.)


Time: 06:36





Attending/Attestation





- Attestation


I have personally seen and examined this patient.: Yes


I have fully participated in the care of the patient.: Yes


I have reviewed all pertinent clinical information: Yes

## 2018-06-08 NOTE — RAD
Chest x-ray single frontal view 



History: Shortness of breath. 



Comparison: 08/22/2017 



Findings: 



Mild venous congestion. 



Heart size within normal limits. 



Impression: 



Mild venous congestion. 



Heart size within normal limits.

## 2018-06-08 NOTE — CP.PCM.CON
History of Present Illness





- History of Present Illness


History of Present Illness: 





dictated





Past Patient History





- Infectious Disease


Hx of Infectious Diseases: None





- Past Social History


Smoking Status: Never Smoked


Chewing Tobacco Use: No


Cigar Use: No


Alcohol: None


Domestic Violence: Negative





- CARDIAC


Hx Hypertension: No





- PULMONARY


Hx Tuberculosis: No





- NEUROLOGICAL


Hx Migraine: Yes


Hx Seizures: No





- HEENT


Hx HEENT Problems: No





- RENAL


Hx Chronic Kidney Disease: No





- ENDOCRINE/METABOLIC


Other/Comment: "low blood sugar"





- HEMATOLOGICAL/ONCOLOGICAL


Hx Anemia: Yes


Hx Human Immunodeficiency Virus (HIV): No





- INTEGUMENTARY


Hx Dermatological Problems: No





- MUSCULOSKELETAL/RHEUMATOLOGICAL


Hx Falls: No





- GASTROINTESTINAL


Hx Gastrointestinal Disorders: No





- GENITOURINARY/GYNECOLOGICAL


Hx Sexually Transmitted Disorders: No





- PSYCHIATRIC


Hx Depression: Yes


Hx Substance Use: No





- SURGICAL HISTORY


Hx Appendectomy: Yes


Hx Cholecystectomy: Yes





- ANESTHESIA


Hx Anesthesia: Yes


Hx Anesthesia Reactions: No





Meds


Home Medications: 


 Home Medication List











 Medication  Instructions  Recorded  Confirmed  Type


 


Ciprofloxacin [Cipro] 500 mg PO BID #14 tab 06/07/18  Rx


 


Ibuprofen [Motrin] 600 mg PO Q6 #25 tab 06/07/18  Rx


 


Ondansetron ODT [Zofran ODT] 4 mg PO Q8 #12 odt 06/07/18  Rx


 


Phenazopyridine [Pyridium] 1 tab PO Q8 #6 tab 06/07/18  Rx











Allergies/Adverse Reactions: 


 Allergies











Allergy/AdvReac Type Severity Reaction Status Date / Time


 


No Known Allergies Allergy   Verified 06/07/18 19:52














- Medications


Medications: 


 Current Medications





Hydromorphone HCl (Dilaudid)  1 mg IVP Q4H PRN


   PRN Reason: Pain, severe (8-10)


Lactated Ringer's (Lactated Ringer's)  1,000 mls @ 150 mls/hr IV .Q6H40M [facility]


   Last Admin: 06/08/18 15:39 Dose:  Not Given


Cefepime HCl (Maxipime Iv 1 Gm Premix)  1 gm in 50 mls @ 100 mls/hr IVPB Q12H 

[facility]


   PRN Reason: Protocol


   Last Admin: 06/08/18 14:00 Dose:  100 mls/hr


Morphine Sulfate (Morphine)  2 mg IV Q4H [facility]


   Last Admin: 06/08/18 14:28 Dose:  2 mg


Ondansetron HCl (Zofran Inj)  4 mg IVP Q6H PRN


   PRN Reason: Nausea/Vomiting


Pneumococcal Polyvalent Vaccine (Pneumovax 23 Vaccine)  0.5 ml IM .ONCE ONE


   Stop: 06/10/18 10:01


Saccharomyces Boulardii (Florastor)  250 mg PO BID [facility]


Trazodone HCl (Desyrel)  25 mg PO HS [facility]











Results





- Vital Signs


Recent Vital Signs: 


 Last Vital Signs











Temp  98.1 F   06/08/18 15:30


 


Pulse  64   06/08/18 15:30


 


Resp  20   06/08/18 15:30


 


BP  90/50 L  06/08/18 15:30


 


Pulse Ox  98   06/08/18 15:30














- Labs


Result Diagrams: 


 06/08/18 14:57





 06/08/18 14:57


Labs: 


 Laboratory Results - last 24 hr











  06/07/18 06/07/18 06/07/18





  20:20 20:26 20:26


 


WBC   9.0  D 


 


RBC   4.13 


 


Hgb   10.9 L 


 


Hct   32.8 L 


 


MCV   79.4 L 


 


MCH   26.3 L 


 


MCHC   33.2 


 


RDW   21.8 H 


 


Plt Count   257 


 


MPV   8.4 


 


Neut % (Auto)   86.2 H 


 


Lymph % (Auto)   9.0 L 


 


Mono % (Auto)   3.5 


 


Eos % (Auto)   0.8 


 


Baso % (Auto)   0.5 


 


Neut # (Auto)   7.8 H 


 


Lymph # (Auto)   0.8 L 


 


Mono # (Auto)   0.3 


 


Eos # (Auto)   0.1 


 


Baso # (Auto)   0.0 


 


Neutrophils % (Manual)   83 H 


 


Band Neutrophils %   1 


 


Lymphocytes % (Manual)   11 L 


 


Monocytes % (Manual)   3 


 


Eosinophils % (Manual)   2 


 


Platelet Estimate   Normal 


 


Microcytosis (manual)   Slight 


 


PT   


 


INR   


 


APTT   


 


Puncture Site   


 


pCO2   


 


pO2   


 


HCO3   


 


ABG pH   


 


ABG Total CO2   


 


ABG O2 Saturation   


 


ABG Base Excess   


 


Natalio Test   


 


ABG Potassium   


 


A-a O2 Difference   


 


Respiratory Index   


 


Glucose   


 


Lactate   


 


Liter Flow   


 


FiO2   


 


Sodium    137


 


Potassium    3.9


 


Chloride    100


 


Carbon Dioxide    25


 


Anion Gap    16


 


BUN    13


 


Creatinine    0.6 L


 


Est GFR ( Amer)    > 60


 


Est GFR (Non-Af Amer)    > 60


 


Random Glucose    91


 


Calcium    9.1


 


Total Bilirubin    0.7


 


AST    23


 


ALT    24


 


Alkaline Phosphatase    107


 


Total Protein    7.8


 


Albumin    4.1


 


Globulin    3.7


 


Albumin/Globulin Ratio    1.1


 


Lipase    142


 


Arterial Blood Potassium   


 


Urine Color   


 


Urine Clarity   


 


Urine pH   


 


Ur Specific Gravity   


 


Urine Protein   


 


Urine Glucose (UA)   


 


Urine Ketones   


 


Urine Blood   


 


Urine Nitrate   


 


Urine Bilirubin   


 


Urine Urobilinogen   


 


Ur Leukocyte Esterase   


 


Urine WBC (Auto)   


 


Urine RBC (Auto)   


 


Ur Squamous Epith Cells   


 


Urine Bacteria   


 


Urine HCG, Qual  Negative  














  06/07/18 06/07/18 06/08/18





  20:43 21:57 00:55


 


WBC   


 


RBC   


 


Hgb   


 


Hct   


 


MCV   


 


MCH   


 


MCHC   


 


RDW   


 


Plt Count   


 


MPV   


 


Neut % (Auto)   


 


Lymph % (Auto)   


 


Mono % (Auto)   


 


Eos % (Auto)   


 


Baso % (Auto)   


 


Neut # (Auto)   


 


Lymph # (Auto)   


 


Mono # (Auto)   


 


Eos # (Auto)   


 


Baso # (Auto)   


 


Neutrophils % (Manual)   


 


Band Neutrophils %   


 


Lymphocytes % (Manual)   


 


Monocytes % (Manual)   


 


Eosinophils % (Manual)   


 


Platelet Estimate   


 


Microcytosis (manual)   


 


PT    11.9


 


INR    1.1


 


APTT    28


 


Puncture Site   Rba 


 


pCO2   31 L 


 


pO2   106 H 


 


HCO3   23.3 


 


ABG pH   7.44 


 


ABG Total CO2   22.1 


 


ABG O2 Saturation   99.3 H 


 


ABG Base Excess   -2.1 L 


 


Natalio Test   Pos 


 


ABG Potassium   3.2 L 


 


A-a O2 Difference   5.0 


 


Respiratory Index   0 


 


Glucose   91 


 


Lactate   0.6 L 


 


Liter Flow   0 


 


FiO2   21.0 


 


Sodium   135.0 


 


Potassium   


 


Chloride   107.0 


 


Carbon Dioxide   


 


Anion Gap   


 


BUN   


 


Creatinine   


 


Est GFR ( Amer)   


 


Est GFR (Non-Af Amer)   


 


Random Glucose   


 


Calcium   


 


Total Bilirubin   


 


AST   


 


ALT   


 


Alkaline Phosphatase   


 


Total Protein   


 


Albumin   


 


Globulin   


 


Albumin/Globulin Ratio   


 


Lipase   


 


Arterial Blood Potassium   3.2 L 


 


Urine Color  Yellow  


 


Urine Clarity  Hazy  


 


Urine pH  7.0  


 


Ur Specific Gravity  1.012  


 


Urine Protein  Negative  


 


Urine Glucose (UA)  Normal  


 


Urine Ketones  Negative  


 


Urine Blood  2+ H  


 


Urine Nitrate  Positive H  


 


Urine Bilirubin  Negative  


 


Urine Urobilinogen  Normal  


 


Ur Leukocyte Esterase  2+ H  


 


Urine WBC (Auto)  114 H  


 


Urine RBC (Auto)  4 H  


 


Ur Squamous Epith Cells  2  


 


Urine Bacteria  Mod H  


 


Urine HCG, Qual   














  06/08/18 06/08/18





  14:57 14:57


 


WBC  8.6 


 


RBC  3.48 L 


 


Hgb  9.2 L 


 


Hct  27.8 L 


 


MCV  79.9 L 


 


MCH  26.4 L 


 


MCHC  33.0 


 


RDW  21.7 H 


 


Plt Count  210 


 


MPV  8.6 


 


Neut % (Auto)  78.7 H 


 


Lymph % (Auto)  12.4 L 


 


Mono % (Auto)  8.3 


 


Eos % (Auto)  0.2 


 


Baso % (Auto)  0.4 


 


Neut # (Auto)  6.8 


 


Lymph # (Auto)  1.1 


 


Mono # (Auto)  0.7 


 


Eos # (Auto)  0.0 


 


Baso # (Auto)  0.0 


 


Neutrophils % (Manual)  


 


Band Neutrophils %  


 


Lymphocytes % (Manual)  


 


Monocytes % (Manual)  


 


Eosinophils % (Manual)  


 


Platelet Estimate  


 


Microcytosis (manual)  


 


PT  


 


INR  


 


APTT  


 


Puncture Site  


 


pCO2  


 


pO2  


 


HCO3  


 


ABG pH  


 


ABG Total CO2  


 


ABG O2 Saturation  


 


ABG Base Excess  


 


Natalio Test  


 


ABG Potassium  


 


A-a O2 Difference  


 


Respiratory Index  


 


Glucose  


 


Lactate  


 


Liter Flow  


 


FiO2  


 


Sodium   138


 


Potassium   4.0


 


Chloride   104


 


Carbon Dioxide   24


 


Anion Gap   13


 


BUN   9


 


Creatinine   0.6 L


 


Est GFR ( Amer)   > 60


 


Est GFR (Non-Af Amer)   > 60


 


Random Glucose   98


 


Calcium   8.4 L


 


Total Bilirubin   1.3


 


AST   417 H D


 


ALT   275 H D


 


Alkaline Phosphatase   92


 


Total Protein   6.6


 


Albumin   3.4 L


 


Globulin   3.2


 


Albumin/Globulin Ratio   1.1


 


Lipase  


 


Arterial Blood Potassium  


 


Urine Color  


 


Urine Clarity  


 


Urine pH  


 


Ur Specific Gravity  


 


Urine Protein  


 


Urine Glucose (UA)  


 


Urine Ketones  


 


Urine Blood  


 


Urine Nitrate  


 


Urine Bilirubin  


 


Urine Urobilinogen  


 


Ur Leukocyte Esterase  


 


Urine WBC (Auto)  


 


Urine RBC (Auto)  


 


Ur Squamous Epith Cells  


 


Urine Bacteria  


 


Urine HCG, Qual  














Assessment & Plan


(1) Pyelonephritis due to Escherichia coli


Status: Acute   





(2) Sepsis


Status: Acute   





(3) UTI (urinary tract infection)


Status: Acute   





(4) Chest wall pain


Status: Acute

## 2018-06-08 NOTE — CP.PCM.PN
<Abadier,Michael - Last Filed: 06/08/18 13:49>





Subjective





- Date & Time of Evaluation


Date of Evaluation: 06/08/18


Time of Evaluation: 13:49





- Subjective


Subjective: 





Patient seen and examined at bedside. Complaining of severe suprapubic pain and 

episode of hematuria. No chest pain or SOB. No fevers or chills. 





Objective





- Vital Signs/Intake and Output


Vital Signs (last 24 hours): 


 











Temp Pulse Resp BP Pulse Ox


 


 98.3 F   64   20   95/59 L  100 


 


 06/08/18 07:10  06/08/18 07:10  06/08/18 07:10  06/08/18 07:10  06/08/18 07:10








Intake and Output: 


 











 06/08/18 06/08/18





 06:59 18:59


 


Intake Total 800 


 


Balance 800 














- Medications


Medications: 


 Current Medications





Fluoxetine HCl (Prozac)  10 mg PO DAILY [facility]


Hydromorphone HCl (Dilaudid)  1 mg IVP Q4H PRN


   PRN Reason: Pain, severe (8-10)


Lactated Ringer's (Lactated Ringer's)  1,000 mls @ 150 mls/hr IV .Q6H40M [facility]


   Last Admin: 06/08/18 09:46 Dose:  150 mls/hr


Cefepime HCl (Maxipime Iv 1 Gm Premix)  1 gm in 50 mls @ 100 mls/hr IVPB Q12H 

[facility]


   PRN Reason: Protocol


Morphine Sulfate (Morphine)  2 mg IV Q4H [facility]


Ondansetron HCl (Zofran Inj)  4 mg IVP Q6H PRN


   PRN Reason: Nausea/Vomiting


Pneumococcal Polyvalent Vaccine (Pneumovax 23 Vaccine)  0.5 ml IM .ONCE ONE


   Stop: 06/10/18 10:01


Saccharomyces Boulardii (Florastor)  250 mg PO BID [facility]


Trazodone HCl (Desyrel)  25 mg PO HS [facility]











- Labs


Labs: 


 





 06/07/18 20:26 





 06/07/18 20:26 





 











PT  11.9 SECONDS (9.7-12.2)   06/08/18  00:55    


 


INR  1.1   06/08/18  00:55    


 


APTT  28 SECONDS (21-34)   06/08/18  00:55    














- Constitutional


Appears: In Acute Distress (looks very uncomfortable. Sitting upright. unable 

to relax. )





- Head Exam


Head Exam: ATRAUMATIC, NORMAL INSPECTION, NORMOCEPHALIC





- Eye Exam


Eye Exam: EOMI





- ENT Exam


ENT Exam: Mucous Membranes Moist





- Neck Exam


Neck Exam: Full ROM





- Respiratory Exam


Respiratory Exam: Clear to Ausculation Bilateral, NORMAL BREATHING PATTERN.  

absent: Wheezes





- Cardiovascular Exam


Cardiovascular Exam: REGULAR RHYTHM





- GI/Abdominal Exam


GI & Abdominal Exam: Soft, Tenderness (suprabic tenderness), Normal Bowel 

Sounds.  absent: Distended





- Extremities Exam


Extremities Exam: absent: Joint Swelling, Tenderness





- Back Exam


Back Exam: CVA tenderness (L).  absent: CVA tenderness (R)





- Neurological Exam


Neurological Exam: Alert, Awake, Oriented x3





- Psychiatric Exam


Psychiatric exam: Normal Affect, Normal Mood





- Skin


Skin Exam: Dry, Intact, Normal Color, Warm





Assessment and Plan


(1) Pyelonephritis due to Escherichia coli


Assessment & Plan: 


Patient has severe pain and is unable to relax despite 4mg of morphine. Also 

complaining of gross hematuria. CT was done and shows nonspecific stranding of 

left kidney. No stones identified. Patient was previously on cipro but was not 

improving so changed to maxipime. Uro consult. Will get renal and bladder US. 

Pain control with morphine. Urine culture gram negative hyacinth most likely E. 

coli. Will follow up sensitivities. Aggressive fluid management. Zofran PRN for 

nausea. VTE ppx clearly contraindicated as patient is having gross hematuria. 

No GI ppx is indicated at this time. 


Status: Acute   





<Theresa Germain - Last Filed: 06/08/18 16:05>





Objective





- Vital Signs/Intake and Output


Vital Signs (last 24 hours): 


 











Temp Pulse Resp BP Pulse Ox


 


 98.3 F   64   20   94/53 L  100 


 


 06/08/18 07:10  06/08/18 07:10  06/08/18 07:10  06/08/18 14:18  06/08/18 07:10








Intake and Output: 


 











 06/08/18 06/08/18





 06:59 18:59


 


Intake Total 800 


 


Balance 800 














- Medications


Medications: 


 Current Medications





Fluoxetine HCl (Prozac)  10 mg PO DAILY [facility]


Hydromorphone HCl (Dilaudid)  1 mg IVP Q4H PRN


   PRN Reason: Pain, severe (8-10)


Lactated Ringer's (Lactated Ringer's)  1,000 mls @ 150 mls/hr IV .Q6H40M [facility]


   Last Admin: 06/08/18 15:39 Dose:  Not Given


Cefepime HCl (Maxipime Iv 1 Gm Premix)  1 gm in 50 mls @ 100 mls/hr IVPB Q12H 

[facility]


   PRN Reason: Protocol


   Last Admin: 06/08/18 14:00 Dose:  100 mls/hr


Sodium Chloride (Sodium Chloride 0.9%)  1,000 mls @ 1,000 mls/hr IV .Q1H ONE


   Stop: 06/08/18 15:53


   Last Admin: 06/08/18 15:06 Dose:  1,000 mls/hr


Morphine Sulfate (Morphine)  2 mg IV Q4H [facility]


   Last Admin: 06/08/18 14:28 Dose:  2 mg


Ondansetron HCl (Zofran Inj)  4 mg IVP Q6H PRN


   PRN Reason: Nausea/Vomiting


Pneumococcal Polyvalent Vaccine (Pneumovax 23 Vaccine)  0.5 ml IM .ONCE ONE


   Stop: 06/10/18 10:01


Saccharomyces Boulardii (Florastor)  250 mg PO BID [facility]


Trazodone HCl (Desyrel)  25 mg PO HS [facility]











- Labs


Labs: 


 





 06/08/18 14:57 





 06/08/18 14:57 





 











PT  11.9 SECONDS (9.7-12.2)   06/08/18  00:55    


 


INR  1.1   06/08/18  00:55    


 


APTT  28 SECONDS (21-34)   06/08/18  00:55    














Attending/Attestation





- Attestation


I have personally seen and examined this patient.: Yes


I have fully participated in the care of the patient.: Yes


I have reviewed all pertinent clinical information, including history, physical 

exam and plan: Yes


Notes (Text): 


Patient seen, examined and case discussed with day-time resident.


Patient reports bilateral flank pain, lower right and left quadrant pains. 

Patient has received Morphine 4mg IV X1 at bedside but is in quite bit of pain.


We have ordered for CT abdomen/pelvis w/o po and iv contrast and renal US; 

given patient has history of kidney stones.


Case discussed with urology and infectious disease who will come to evaluate 

the patient.





(1) Pyelonephritis due to Escherichia coli


       History of Neprolithaisis


       Hematuria


Assessment & Plan: 


* Urology (Dr. Shea Resendiz) on consult-->help appreciated


* Infectious Disease (Dr. Kelsey) on consult-->help appreciated


 * ABx switched to Meropenem 1mgram IV Q 8H


* Patient has severe pain and is unable to relax despite 4mg of morphine.


* CT abdomen/pelvis w/o PO and IV contrast (6/8/18): no obstructive uropathy or 

evidence of recently passed  calculus. Nonspecific left perinephric stranding 

for which pyelonephritis cannot be excluded on this enhanced on CT


* Renal US (6/8/18): mild thickening versus underdistention of the urinary 

bladder. 1 cm of left renal cyst. If hematuria persists, consider correlation w 

hematuria protocol CT scan


* Urine culture (6/7/18): gram negative hyacinth


* Note: patient received dose of Pyridum last night from ER; though she has 

history of anemia; will continue to monitor urine output for hematuria


* Pain PRN


 * Dilaudid 1mg IV Q4H PRN severe pain


 * Morphine 2mg IV Q4H moderate pain


* IV fluids: /cchr


* Florastor 250mg PO BID


* Blood culture (6/7/18): pending


Status: Acute   





(2) History of Anemia


Assessment & Plan: 


* Patient has history of menorrhagia, awaiting outpatient evaluation by OB-GYN


* Will order for ferritin, iron studies, b12, folate, reticulocyte count


* Type and screen ordered


* Hgb drop from 10.6 to 9.2 unclear if dilutional vs hematuria since patient 

has receive fluid bolus and on maintenance IV fluids





(3) History of Depression


Assessment & Plan:


* Patient takes Fluoxetine and Trazodone as outpatient


* Will hold Fluoxetine in light of liver function tests





(4) Transaminitis


 Assessment & Plan:


* continue to monitor LFTS


* will hold tylenol/SSRI/zofran given LFTS





(5) Prophylactic care


 Assessment & Plan:


* No chemical indication secondary to hematuria, anemia, hx of menorrhagia


* SCDS b/l





Patient changed to inpatient give hematuria, intractable back pain under the 

setting of pyelonephritis; will need urology and infectious disease for further 

recommendations

## 2018-06-09 LAB
ALBUMIN SERPL-MCNC: 2.9 G/DL (ref 3.5–5)
ALBUMIN/GLOB SERPL: 1 {RATIO} (ref 1–2.1)
ALT SERPL-CCNC: 218 U/L (ref 9–52)
AST SERPL-CCNC: 159 U/L (ref 14–36)
BASOPHILS # BLD AUTO: 0 K/UL (ref 0–0.2)
BASOPHILS NFR BLD: 0.6 % (ref 0–2)
BUN SERPL-MCNC: 5 MG/DL (ref 7–17)
CALCIUM SERPL-MCNC: 8.5 MG/DL (ref 8.6–10.4)
EOSINOPHIL # BLD AUTO: 0.2 K/UL (ref 0–0.7)
EOSINOPHIL NFR BLD: 2.6 % (ref 0–4)
ERYTHROCYTE [DISTWIDTH] IN BLOOD BY AUTOMATED COUNT: 21.9 % (ref 11.5–14.5)
GFR NON-AFRICAN AMERICAN: > 60
HGB BLD-MCNC: 8.9 G/DL (ref 11–16)
LYMPHOCYTES # BLD AUTO: 2.2 K/UL (ref 1–4.3)
LYMPHOCYTES NFR BLD AUTO: 32 % (ref 20–40)
MCH RBC QN AUTO: 26.7 PG (ref 27–31)
MCHC RBC AUTO-ENTMCNC: 33.3 G/DL (ref 33–37)
MCV RBC AUTO: 80 FL (ref 81–99)
MONOCYTES # BLD: 0.6 K/UL (ref 0–0.8)
MONOCYTES NFR BLD: 9.2 % (ref 0–10)
NEUTROPHILS # BLD: 3.8 K/UL (ref 1.8–7)
NEUTROPHILS NFR BLD AUTO: 55.6 % (ref 50–75)
NRBC BLD AUTO-RTO: 0 % (ref 0–2)
PLATELET # BLD: 214 K/UL (ref 130–400)
PMV BLD AUTO: 8.8 FL (ref 7.2–11.7)
RBC # BLD AUTO: 3.36 MIL/UL (ref 3.8–5.2)
WBC # BLD AUTO: 6.9 K/UL (ref 4.8–10.8)

## 2018-06-09 RX ADMIN — TRAZODONE HYDROCHLORIDE SCH MG: 50 TABLET ORAL at 22:15

## 2018-06-09 RX ADMIN — Medication SCH MG: at 18:07

## 2018-06-09 RX ADMIN — Medication SCH MG: at 09:21

## 2018-06-09 NOTE — CP.PCM.PN
<Sania Noel DO - Last Filed: 06/09/18 12:05>





Subjective





- Date & Time of Evaluation


Date of Evaluation: 06/09/18


Time of Evaluation: 10:00





- Subjective


Subjective: 





Medicine progress note for Dr. Germain's service





Patient seen and examined.  Patient complains of back pain as well as 

suprapubic pain.  Patient complains of feeling swollen in her whole body.  She 

states urine was less red today as compared to yesterday.  She also complains 

of pain with deep inspiration.  She states she has not had a bowel movement in 

two days.





Objective





- Vital Signs/Intake and Output


Vital Signs (last 24 hours): 


 











Temp Pulse Resp BP Pulse Ox


 


 97.9 F   75   20   92/59 L  98 


 


 06/09/18 07:00  06/09/18 07:00  06/09/18 07:00  06/09/18 07:00  06/09/18 07:00








Intake and Output: 


 











 06/09/18 06/09/18





 06:59 18:59


 


Intake Total 3190 


 


Balance 3190 














- Medications


Medications: 


 Current Medications





Hydromorphone HCl (Dilaudid)  1 mg IVP Q4H PRN


   PRN Reason: Pain, severe (8-10)


Lactated Ringer's (Lactated Ringer's)  1,000 mls @ 150 mls/hr IV .Q6H40M [facility]


   Last Admin: 06/09/18 05:46 Dose:  Not Given


Meropenem 1 gm/ Sodium (Chloride)  100 mls @ 100 mls/hr IVPB Q8H [facility]


   PRN Reason: Protocol


   Last Admin: 06/09/18 09:21 Dose:  100 mls/hr


Morphine Sulfate (Morphine)  2 mg IV Q4H [facility]


   Last Admin: 06/09/18 10:24 Dose:  Not Given


Pneumococcal Polyvalent Vaccine (Pneumovax 23 Vaccine)  0.5 ml IM .ONCE ONE


   Stop: 06/10/18 10:01


Saccharomyces Boulardii (Florastor)  250 mg PO BID [facility]


   Last Admin: 06/09/18 09:21 Dose:  250 mg


Trazodone HCl (Desyrel)  25 mg PO HS [facility]


   Last Admin: 06/08/18 22:40 Dose:  25 mg











- Labs


Labs: 


 





 06/09/18 06:18 





 06/09/18 06:18 





 











PT  11.9 SECONDS (9.7-12.2)   06/08/18  00:55    


 


INR  1.1   06/08/18  00:55    


 


APTT  28 SECONDS (21-34)   06/08/18  00:55    














- Constitutional


Appears: No Acute Distress





- Head Exam


Head Exam: ATRAUMATIC, NORMOCEPHALIC





- Eye Exam


Eye Exam: EOMI





- ENT Exam


ENT Exam: Mucous Membranes Moist





- Respiratory Exam


Respiratory Exam: Clear to Ausculation Bilateral, NORMAL BREATHING PATTERN.  

absent: Rales, Rhonchi, Wheezes





- Cardiovascular Exam


Cardiovascular Exam: +S1, +S2





- GI/Abdominal Exam


GI & Abdominal Exam: Soft, Normal Bowel Sounds





-  Exam


Additional comments: 





suprapubic tenderness





- Extremities Exam


Additional comments: 





diffuse non-pitting edema of the upper and lower extremities





- Back Exam


Back Exam: CVA tenderness (L), CVA tenderness (R)





- Neurological Exam


Neurological Exam: Alert, Awake





- Psychiatric Exam


Psychiatric exam: Normal Affect





- Skin


Skin Exam: Warm





Assessment and Plan





- Assessment and Plan (Free Text)


Assessment: 





(1) Pyelonephritis due to Escherichia coli


       History of Neprolithaisis


       Hematuria


Assessment & Plan: 


* Urology (Dr. Shea Resendiz) on consult-->help appreciated


* Infectious Disease (Dr. Kelsey) on consult-->help appreciated


 * ABx switched to Meropenem 1 gram IV Q 8H 6/8/18


* Patient has severe pain and is unable to relax despite 4mg of morphine.


* CT abdomen/pelvis w/o PO and IV contrast (6/8/18): no obstructive uropathy or 

evidence of recently passed  calculus. Nonspecific left perinephric stranding 

for which pyelonephritis cannot be excluded on this enhanced on CT


* Renal US (6/8/18): mild thickening versus underdistention of the urinary 

bladder. 1 cm of left renal cyst. If hematuria persists, consider correlation w 

hematuria protocol CT scan


* Note: patient received dose of Pyridum in ER; though she has history of anemia

; will continue to monitor urine output for hematuria


* Pain PRN


 * oxycodone 5mg q6 prn


 * toradol x 2 doses


 * morphine and dilaudid was not being given due to low blood pressure


* IV fluids: /cchr


* Florastor 250mg PO BID


* Blood culture (6/7/18): no growth 24 h


* urine culture (6/7/19): E. coli, sensitive to meropenem; will repeat tomorrow


Status: Acute   





(2) History of Anemia


Assessment & Plan: 


* Patient has history of menorrhagia, awaiting outpatient evaluation by OB-GYN


* retic count 0.6, ferritin 33.4; will hold on starting iron in light of 

current infection


* Type and screen ordered


* Hgb drop from 10.6 to 9.2 to 8.9 unclear if dilutional vs hematuria since 

patient has receive fluid bolus and on maintenance IV fluids





(3) History of Depression


Assessment & Plan:


* Patient takes Fluoxetine and Trazodone as outpatient


* Will hold Fluoxetine in light of liver function tests





(4) Transaminitis


 Assessment & Plan:


* continue to monitor LFTS- down trending


* will hold tylenol/SSRI/zofran given LFTS





(5) Prophylactic care


 Assessment & Plan:


* No chemical indication secondary to hematuria, anemia, hx of menorrhagia


* SCDS b/l





<Theresa Germain V - Last Filed: 06/09/18 16:19>





Objective





- Vital Signs/Intake and Output


Vital Signs (last 24 hours): 


 











Temp Pulse Resp BP Pulse Ox


 


 97.9 F   75   20   92/59 L  98 


 


 06/09/18 07:00  06/09/18 07:00  06/09/18 07:00  06/09/18 07:00  06/09/18 07:00








Intake and Output: 


 











 06/09/18 06/09/18





 06:59 18:59


 


Intake Total 3190 1400


 


Balance 3190 1400














- Medications


Medications: 


 Current Medications





Docusate Sodium (Colace)  100 mg PO BID [facility]


   Last Admin: 06/09/18 13:32 Dose:  100 mg


Lactated Ringer's (Lactated Ringer's)  1,000 mls @ 150 mls/hr IV .Q6H40M [facility]


   Last Admin: 06/09/18 12:51 Dose:  Not Given


Meropenem 1 gm/ Sodium (Chloride)  100 mls @ 100 mls/hr IVPB Q8H [facility]


   PRN Reason: Protocol


   Last Admin: 06/09/18 09:21 Dose:  100 mls/hr


Ketorolac Tromethamine (Toradol)  30 mg IVP Q6 [facility]


   Stop: 06/09/18 18:01


   Last Admin: 06/09/18 13:32 Dose:  30 mg


Oxycodone HCl (Oxycodone Immediate Release Tab)  5 mg PO Q6 PRN


   PRN Reason: Pain, severe (8-10)


Pneumococcal Polyvalent Vaccine (Pneumovax 23 Vaccine)  0.5 ml IM .ONCE ONE


   Stop: 06/10/18 10:01


Saccharomyces Boulardii (Florastor)  250 mg PO BID [facility]


   Last Admin: 06/09/18 09:21 Dose:  250 mg


Trazodone HCl (Desyrel)  25 mg PO HS [facility]


   Last Admin: 06/08/18 22:40 Dose:  25 mg











- Labs


Labs: 


 





 06/09/18 06:18 





 06/09/18 06:18 





 











PT  11.9 SECONDS (9.7-12.2)   06/08/18  00:55    


 


INR  1.1   06/08/18  00:55    


 


APTT  28 SECONDS (21-34)   06/08/18  00:55    














Attending/Attestation





- Attestation


I have personally seen and examined this patient.: Yes


I have fully participated in the care of the patient.: Yes


I have reviewed all pertinent clinical information, including history, physical 

exam and plan: Yes


Notes (Text): 


Patient seen, examined and case discussed with medical resident.


Patient reports pain is about the same over the b/l flanks and suprapublic 

pain. Patient has not received pain PRN secondary to borderline low blood 

pressure.


We have d/c patient's pain medications in favor of oxycodone PRN to see if this 

help. We will continue IV fluids. Patient is feeling swollen likely due to 

maintenace fluids. patient reports urine is pink. We have advised her to not 

flush so we can see if the urine is red with blood or if effect of pyridium. 

Patient's urine is sensitive to Cipro and Meropenem. We will collect urine 

culture repeat tomorrow. Blood cultures are negative.


Agree with the assessment and plan as written by the resident above.

## 2018-06-09 NOTE — CON
DATE:  2018



INFECTIOUS DISEASE CONSULT



REQUESTED BY:  Dr. Woo and Dr. Germain.



HISTORY OF PRESENT ILLNESS:  This patient is 38-year-old female.  She was

complaining of severe CVA pain and she was also having dysuria and I see

she has pyuria and I was asked to see her, as she came in with abdominal

pain.  She was sick looking with lot of pain and she says she has been

having this for 7 days and got worse in last 2 days.  She says she has had

2 times infections before.  She denies any diabetes, denies any

hypertension.  She was started on Cipro, Motrin, Zofran and Pyridium in the

ER, but she remains with pain and I am asked to evaluate for IV antibiotics

at this time.



PAST MEDICAL HISTORY:  Anemia, cholecystectomy, appendicectomy, ,

and she was complaining of suprapubic pain and flank pains _____ in the

left side.  She says she has had kidney stones in the past and is not

allergic to any medicines and has past medical history of depression and

migraine.  She has no diabetes.  No history of HIV.  Denies any kidney

problem.  She does complain her legs are swollen.  She denies any other

illnesses at this time.



FAMILY HISTORY:  Noncontributory.



SOCIAL HISTORY:  Negative for smoking or drinking.



REVIEW OF SYSTEMS:  She complained of body aches and said was having high

fever.  She also had vomiting and had suprapubic pain when she came in. 

Denied any diarrhea or constipation.  She now complains of pressure on the

chest and back pain and is sick looking.



MEDICATIONS:  Her medications, which I just changed to, she is on pain

medications, Lactated Ringers', meropenem, morphine and IV fluids.  She is

also on lactobacillus and she is on trazodone for her sleep.



PHYSICAL EXAMINATION:

VITAL SIGNS:  I find temperature is 98.1, pulse 64, blood pressure is 90/50

and respirations are 20.

GENERAL:  She appears pale and sick looking.

HEENT:  Head is atraumatic.  Pupils are reacting to light.

NECK:  Supple.  JVP is flat.

CHEST:  Wall is symmetrical.

LUNGS:  Clear.

HEART:  S1 and S2 are regular.

ABDOMEN:  Soft.  There is CVA tenderness present and suprapubic tenderness

present.

EXTREMITIES:  Has may be trace edema on lower extremities.



LABORATORY DATA:  White count is 8.6, hemoglobin 9.2, hematocrit 27.8,

platelet count is 210.  Her urine showed leucocyte and 114 wbc's, rbc is 4.

Sodium is 138, potassium 4, chloride is 104, CO2 is 24, BUN is 9,

creatinine 0.6.  Urine culture has gram negative rods.  She also had CT

abdomen and they also did a bladder ultrasound at this time and the bladder

ultrasound shows mild thickening versus under distention of urinary bladder

1 cm, left renal cyst.  If hematuria persist consider correlation with a

hematuria protocol.  Actually, she did have some hematuria, but she was on

Pyridium also started in the ER.  The left kidney shows no calculi or

hydronephrosis mid pole _____ cyst measuring 1 x 0.5 x 0.6 cm and right

kidney has no calculi or hydronephrosis, that was from the bladder

ultrasound and then we did abdominal CAT scan and pelvic CT, which showed

no obstructive uropathy or evidence of recently passed  calculus,

nonspecific left perinephric stranding for which pyelonephritis cannot be

excluded on this unenhanced CT.  So she clinically does look sick looking

and may have pyelonephritis, even though they did not show stone and she is

in lot of pain when we saw.  Her labs, AST is 417, ALT is 275 which shows

that she is in sepsis syndrome with multiorgan involvement and

procalcitonin was only 0.21, I am surprised with that, so will leave her on

the Merrem at this time and I want to make sure blood cultures were done or

not done, has been collected.  We will follow.



IMPRESSION AND PLAN:  This patient has hydronephrosis, has history of

kidney stones in the past, and has had urinary tract infection in the past,

will cover with Merrem at this time.  She remains hypotensive, blood

pressure is low and needs IV fluids at this time and pain medications and

will follow.







__________________________________________

Perla Kelsey MD





DD:  2018 20:02:52

DT:  2018 21:25:49

Job # [id number]

## 2018-06-10 LAB
ALBUMIN SERPL-MCNC: 3.2 G/DL (ref 3.5–5)
ALBUMIN/GLOB SERPL: 1.1 {RATIO} (ref 1–2.1)
ALT SERPL-CCNC: 159 U/L (ref 9–52)
AST SERPL-CCNC: 78 U/L (ref 14–36)
BASOPHILS # BLD AUTO: 0 K/UL (ref 0–0.2)
BASOPHILS NFR BLD: 0.8 % (ref 0–2)
BILIRUB UR-MCNC: NEGATIVE MG/DL
BUN SERPL-MCNC: 7 MG/DL (ref 7–17)
CALCIUM SERPL-MCNC: 8.5 MG/DL (ref 8.6–10.4)
EOSINOPHIL # BLD AUTO: 0.1 K/UL (ref 0–0.7)
EOSINOPHIL NFR BLD: 2.5 % (ref 0–4)
ERYTHROCYTE [DISTWIDTH] IN BLOOD BY AUTOMATED COUNT: 21.6 % (ref 11.5–14.5)
GFR NON-AFRICAN AMERICAN: > 60
GLUCOSE UR STRIP-MCNC: NORMAL MG/DL
HGB BLD-MCNC: 9.1 G/DL (ref 11–16)
LEUKOCYTE ESTERASE UR-ACNC: (no result) LEU/UL
LYMPHOCYTES # BLD AUTO: 1.9 K/UL (ref 1–4.3)
LYMPHOCYTES NFR BLD AUTO: 33.4 % (ref 20–40)
MCH RBC QN AUTO: 26.9 PG (ref 27–31)
MCHC RBC AUTO-ENTMCNC: 33.7 G/DL (ref 33–37)
MCV RBC AUTO: 79.8 FL (ref 81–99)
MONOCYTES # BLD: 0.6 K/UL (ref 0–0.8)
MONOCYTES NFR BLD: 10.7 % (ref 0–10)
NEUTROPHILS # BLD: 3 K/UL (ref 1.8–7)
NEUTROPHILS NFR BLD AUTO: 52.6 % (ref 50–75)
NRBC BLD AUTO-RTO: 0.1 % (ref 0–2)
PH UR STRIP: 7 [PH] (ref 5–8)
PLATELET # BLD: 236 K/UL (ref 130–400)
PMV BLD AUTO: 8.7 FL (ref 7.2–11.7)
PROT UR STRIP-MCNC: NEGATIVE MG/DL
RBC # BLD AUTO: 3.37 MIL/UL (ref 3.8–5.2)
RBC # UR STRIP: NEGATIVE /UL
SP GR UR STRIP: 1 (ref 1–1.03)
SQUAMOUS EPITHIAL: 1 /HPF (ref 0–5)
UROBILINOGEN UR-MCNC: NORMAL MG/DL (ref 0.2–1)
WBC # BLD AUTO: 5.8 K/UL (ref 4.8–10.8)

## 2018-06-10 RX ADMIN — Medication SCH MG: at 13:21

## 2018-06-10 RX ADMIN — TRAZODONE HYDROCHLORIDE SCH MG: 50 TABLET ORAL at 21:32

## 2018-06-10 RX ADMIN — Medication SCH MG: at 09:41

## 2018-06-10 RX ADMIN — Medication SCH MG: at 17:35

## 2018-06-10 RX ADMIN — Medication SCH MG: at 21:34

## 2018-06-10 NOTE — CP.PCM.PN
Subjective





- Date & Time of Evaluation


Date of Evaluation: 06/10/18


Time of Evaluation: 04:00





- Subjective


Subjective: 





dictated





Objective





- Vital Signs/Intake and Output


Vital Signs (last 24 hours): 


 











Temp Pulse Resp BP Pulse Ox


 


 98.1 F   67   20   99/67 L  97 


 


 06/10/18 17:07  06/10/18 17:07  06/10/18 17:07  06/10/18 17:07  06/10/18 17:07








Intake and Output: 


 











 06/10/18 06/11/18





 18:59 06:59


 


Intake Total 2800 


 


Balance 2800 














- Medications


Medications: 


 Current Medications





Docusate Sodium (Colace)  100 mg PO BID [facility]


   Last Admin: 06/10/18 17:34 Dose:  100 mg


Lactated Ringer's (Lactated Ringer's)  1,000 mls @ 150 mls/hr IV .Q6H40M [facility]


   Last Admin: 06/10/18 14:30 Dose:  Not Given


Meropenem 1 gm/ Sodium (Chloride)  100 mls @ 100 mls/hr IVPB Q8H [facility]


   PRN Reason: Protocol


   Last Admin: 06/10/18 17:35 Dose:  100 mls/hr


Ketorolac Tromethamine (Toradol)  30 mg IVP Q6 PRN


   PRN Reason: Pain, severe (8-10)


   Last Admin: 06/10/18 17:49 Dose:  30 mg


Oxycodone HCl (Oxycodone Immediate Release Tab)  5 mg PO Q8 [facility]


   Last Admin: 06/10/18 13:21 Dose:  5 mg


Saccharomyces Boulardii (Florastor)  250 mg PO BID [facility]


   Last Admin: 06/10/18 17:35 Dose:  250 mg


Trazodone HCl (Desyrel)  25 mg PO HS [facility]


   Last Admin: 06/09/18 22:15 Dose:  25 mg











- Labs


Labs: 


 





 06/10/18 07:46 





 06/10/18 07:46 





 











PT  11.9 SECONDS (9.7-12.2)   06/08/18  00:55    


 


INR  1.1   06/08/18  00:55    


 


APTT  28 SECONDS (21-34)   06/08/18  00:55    














Assessment and Plan


(1) Pyelonephritis due to Escherichia coli


Status: Acute   





(2) Sepsis


Status: Acute   





(3) UTI (urinary tract infection)


Status: Acute   





(4) Chest wall pain


Status: Acute

## 2018-06-10 NOTE — CARD
--------------- APPROVED REPORT --------------





EKG Measurement

Heart [id number]

CO [id number]

[id number]

[id number]

[id number]



<Conclusion>

Normal sinus rhythm

Normal ECG

## 2018-06-10 NOTE — CP.PCM.PN
<Sania Noel DO - Last Filed: 06/10/18 19:56>





Subjective





- Subjective


Subjective: 





Medicine progress note for Dr. Germain's service





Patient seen and examined.  Patient states urine is normal colored today, not 

pink/ red-tinged.  Patient reports she still has back pain, left greater than 

right.  She complains of generalized swelling and little appetite.  





Objective





- Vital Signs/Intake and Output


Vital Signs (last 24 hours): 


 











Temp Pulse Resp BP Pulse Ox


 


 98.1 F   67   20   99/67 L  97 


 


 06/10/18 17:07  06/10/18 17:07  06/10/18 17:07  06/10/18 17:07  06/10/18 17:07








Intake and Output: 


 











 06/10/18 06/11/18





 18:59 06:59


 


Intake Total 2800 


 


Balance 2800 














- Medications


Medications: 


 Current Medications





Docusate Sodium (Colace)  100 mg PO BID [facility]


   Last Admin: 06/10/18 17:34 Dose:  100 mg


Lactated Ringer's (Lactated Ringer's)  1,000 mls @ 150 mls/hr IV .Q6H40M [facility]


   Last Admin: 06/10/18 14:30 Dose:  Not Given


Meropenem 1 gm/ Sodium (Chloride)  100 mls @ 100 mls/hr IVPB Q8H [facility]


   PRN Reason: Protocol


   Last Admin: 06/10/18 17:35 Dose:  100 mls/hr


Ketorolac Tromethamine (Toradol)  30 mg IVP Q6 PRN


   PRN Reason: Pain, severe (8-10)


   Last Admin: 06/10/18 17:49 Dose:  30 mg


Oxycodone HCl (Oxycodone Immediate Release Tab)  5 mg PO Q8 [facility]


   Last Admin: 06/10/18 13:21 Dose:  5 mg


Saccharomyces Boulardii (Florastor)  250 mg PO BID [facility]


   Last Admin: 06/10/18 17:35 Dose:  250 mg


Trazodone HCl (Desyrel)  25 mg PO HS [facility]


   Last Admin: 06/09/18 22:15 Dose:  25 mg











- Labs


Labs: 


 





 06/10/18 07:46 





 06/10/18 07:46 





 











PT  11.9 SECONDS (9.7-12.2)   06/08/18  00:55    


 


INR  1.1   06/08/18  00:55    


 


APTT  28 SECONDS (21-34)   06/08/18  00:55    














- Constitutional


Appears: No Acute Distress





- Eye Exam


Eye Exam: EOMI





- ENT Exam


ENT Exam: Mucous Membranes Moist





- Respiratory Exam


Respiratory Exam: Clear to Ausculation Bilateral, NORMAL BREATHING PATTERN





- Cardiovascular Exam


Cardiovascular Exam: +S1, +S2





- GI/Abdominal Exam


GI & Abdominal Exam: Soft, Normal Bowel Sounds.  absent: Tenderness





-  Exam


Additional comments: 





suprapubic tenderness





- Back Exam


Back Exam: CVA tenderness (L) (left CVA tenderness greater than right), CVA 

tenderness (R)





- Neurological Exam


Neurological Exam: Alert, Awake





- Psychiatric Exam


Psychiatric exam: Normal Affect





- Skin


Skin Exam: Warm





Assessment and Plan





- Assessment and Plan (Free Text)


Assessment: 





(1) Pyelonephritis due to Escherichia coli


       History of Neprolithaisis


       Hematuria


Assessment & Plan: 


* Urology (Dr. Shea Resendiz) on consult-->help appreciated


* Infectious Disease (Dr. Kelsey) on consult-->help appreciated


 * ABx switched to Meropenem 1 gram IV Q 8H 6/8/18


* Patient has severe pain and is unable to relax despite 4mg of morphine.


* CT abdomen/pelvis w/o PO and IV contrast (6/8/18): no obstructive uropathy or 

evidence of recently passed  calculus. Nonspecific left perinephric stranding 

for which pyelonephritis cannot be excluded on this enhanced on CT


* Renal US (6/8/18): mild thickening versus underdistention of the urinary 

bladder. 1 cm of left renal cyst. If hematuria persists, consider correlation w 

hematuria protocol CT scan


* Note: patient received dose of Pyridum in ER; though she has history of anemia

; will continue to monitor urine output for hematuria


* Pain PRN


 * oxycodone 5mg q8 prn moderate pain


 * toradol q6prn severe pain


 * morphine and dilaudid was not being given due to low blood pressure


* IV fluids: /cchr


* Florastor 250mg PO BID


* Blood culture (6/7/18): no growth 48 h


* urine culture (6/7/19): E. coli, sensitive to meropenem; repeat culture sent 

today (6/10)


Status: Acute   





(2) History of Anemia


Assessment & Plan: 


* Patient has history of menorrhagia, awaiting outpatient evaluation by OB-GYN


* retic count 0.6, ferritin 33.4; will hold on starting iron in light of 

current infection


* Type and screen ordered


* Hgb drop from 10.6 to 9.2 to 8.9, now increased to 9.1- unclear if dilutional 

vs hematuria since patient has receive fluid bolus and on maintenance IV fluids





(3) History of Depression


Assessment & Plan:


* Patient takes Fluoxetine and Trazodone as outpatient


* Will hold Fluoxetine in light of liver function tests





(4) Transaminitis


 Assessment & Plan:


* continue to monitor LFTS- down trending


* will hold tylenol/SSRI/zofran given LFTS





(5) Prophylactic care


 Assessment & Plan:


* No chemical indication secondary to hematuria, anemia, hx of menorrhagia


* SCDS b/l








<Theresa Germain V - Last Filed: 06/13/18 16:14>





Subjective





- Date & Time of Evaluation


Date of Evaluation: 06/10/18


Time of Evaluation: 10:00





Objective





- Vital Signs/Intake and Output


Vital Signs (last 24 hours): 


 











Temp Pulse Resp BP Pulse Ox


 


 98.1 F   67   20   99/67 L  97 


 


 06/10/18 17:07  06/10/18 17:07  06/10/18 17:07  06/10/18 17:07  06/10/18 17:07








Intake and Output: 


 











 06/10/18 06/10/18





 06:59 18:59


 


Intake Total  2800


 


Balance  2800














- Medications


Medications: 


 Current Medications





Docusate Sodium (Colace)  100 mg PO BID [facility]


   Last Admin: 06/10/18 17:34 Dose:  100 mg


Lactated Ringer's (Lactated Ringer's)  1,000 mls @ 150 mls/hr IV .Q6H40M [facility]


   Last Admin: 06/10/18 14:30 Dose:  Not Given


Meropenem 1 gm/ Sodium (Chloride)  100 mls @ 100 mls/hr IVPB Q8H [facility]


   PRN Reason: Protocol


   Last Admin: 06/10/18 17:35 Dose:  100 mls/hr


Ketorolac Tromethamine (Toradol)  30 mg IVP Q6 PRN


   PRN Reason: Pain, severe (8-10)


Oxycodone HCl (Oxycodone Immediate Release Tab)  5 mg PO Q8 [facility]


   Last Admin: 06/10/18 13:21 Dose:  5 mg


Saccharomyces Boulardii (Florastor)  250 mg PO BID [facility]


   Last Admin: 06/10/18 17:35 Dose:  250 mg


Trazodone HCl (Desyrel)  25 mg PO HS [facility]


   Last Admin: 06/09/18 22:15 Dose:  25 mg











- Labs


Labs: 


 





 06/10/18 07:46 





 06/10/18 07:46 





 











PT  11.9 SECONDS (9.7-12.2)   06/08/18  00:55    


 


INR  1.1   06/08/18  00:55    


 


APTT  28 SECONDS (21-34)   06/08/18  00:55    














Attending/Attestation





- Attestation


I have personally seen and examined this patient.: Yes


I have fully participated in the care of the patient.: Yes


I have reviewed all pertinent clinical information, including history, physical 

exam and plan: Yes


Notes (Text): 


This is late computer entry for 6/10/18.


Patient seen, examined, and case discussed with day-time resident.


patient reporting mildly improving suprapubic and flank pains.


Pain regiment adjusted. Toradol IV prn severe pain (3 dose) and oxycodone PRN 

moderate pain. Patient is not asking pain medication as needed though she has 

pain. We have explained to her to use the pain medication so she is not in 

pain. Patient finds success pain abated with NSAID.


Liver enzymes are downtrending.


Will continue IV abx and IV fluids.  RepeatUA and Urine culture collected. 

Urine specimen no noted gross hematuria.





Assessment/Plan


(1) Pyelonephritis due to Escherichia coli


       History of Neprolithaisis


       Hematuria


Assessment & Plan: 


* Urology (Dr. Shea Resendiz) on consult-->help appreciated


* Infectious Disease (Dr. Kelsey) on consult-->help appreciated


 * ABx switched to Meropenem 1 gram IV Q 8H 6/8/18


* Patient has severe pain and is unable to relax despite 4mg of morphine.


* CT abdomen/pelvis w/o PO and IV contrast (6/8/18): no obstructive uropathy or 

evidence of recently passed  calculus. Nonspecific left perinephric stranding 

for which pyelonephritis cannot be excluded on this enhanced on CT


* Renal US (6/8/18): mild thickening versus underdistention of the urinary 

bladder. 1 cm of left renal cyst. If hematuria persists, consider correlation w 

hematuria protocol CT scan


* Note: patient received dose of Pyridum in ER; though she has history of anemia

; will continue to monitor urine output for hematuria


* Pain PRN


 * oxycodone 5mg q6 prn


 * toradol x 2 doses


 * morphine and dilaudid was not being given due to low blood pressure


* IV fluids: /cchr


* Florastor 250mg PO BID


* Blood culture (6/7/18): no growth 48 h


* urine culture (6/7/19): E. coli, sensitive to meropenem; will repeated on 6/10

/18.


Status: Acute   





(2) History of Anemia


Assessment & Plan: 


* Patient has history of menorrhagia, awaiting outpatient evaluation by OB-GYN


* retic count 0.6, ferritin 33.4; will hold on starting iron in light of 

current infection


* Type and screen ordered


* Hgb drop from 10.6 to 9.2 to 8.9 unclear if dilutional vs hematuria since 

patient has receive fluid bolus and on maintenance IV fluids





(3) History of Depression


Assessment & Plan:


* Patient takes Fluoxetine and Trazodone as outpatient


* Will hold Fluoxetine in light of liver function tests





(4) Transaminitis


 Assessment & Plan:


* continue to monitor LFTS- down trending


* will hold tylenol/SSRI/zofran given LFTS





(5) Prophylactic care


 Assessment & Plan:


* No chemical indication secondary to hematuria, anemia, hx of menorrhagia


* SCDS b/l

## 2018-06-11 LAB
ALBUMIN SERPL-MCNC: 3.2 G/DL (ref 3.5–5)
ALBUMIN/GLOB SERPL: 1.1 {RATIO} (ref 1–2.1)
ALT SERPL-CCNC: 146 U/L (ref 9–52)
AST SERPL-CCNC: 69 U/L (ref 14–36)
BASOPHILS # BLD AUTO: 0.1 K/UL (ref 0–0.2)
BASOPHILS NFR BLD: 1.2 % (ref 0–2)
BUN SERPL-MCNC: 10 MG/DL (ref 7–17)
CALCIUM SERPL-MCNC: 8.5 MG/DL (ref 8.6–10.4)
EOSINOPHIL # BLD AUTO: 0.2 K/UL (ref 0–0.7)
EOSINOPHIL NFR BLD: 4.7 % (ref 0–4)
ERYTHROCYTE [DISTWIDTH] IN BLOOD BY AUTOMATED COUNT: 21.2 % (ref 11.5–14.5)
GFR NON-AFRICAN AMERICAN: > 60
HGB BLD-MCNC: 9.4 G/DL (ref 11–16)
LYMPHOCYTES # BLD AUTO: 2.2 K/UL (ref 1–4.3)
LYMPHOCYTES NFR BLD AUTO: 46.9 % (ref 20–40)
MCH RBC QN AUTO: 26.8 PG (ref 27–31)
MCHC RBC AUTO-ENTMCNC: 33.2 G/DL (ref 33–37)
MCV RBC AUTO: 80.5 FL (ref 81–99)
MONOCYTES # BLD: 0.5 K/UL (ref 0–0.8)
MONOCYTES NFR BLD: 11.3 % (ref 0–10)
NEUTROPHILS # BLD: 1.7 K/UL (ref 1.8–7)
NEUTROPHILS NFR BLD AUTO: 35.9 % (ref 50–75)
NRBC BLD AUTO-RTO: 0.1 % (ref 0–2)
PLATELET # BLD: 252 K/UL (ref 130–400)
PMV BLD AUTO: 8.6 FL (ref 7.2–11.7)
RBC # BLD AUTO: 3.52 MIL/UL (ref 3.8–5.2)
WBC # BLD AUTO: 4.7 K/UL (ref 4.8–10.8)

## 2018-06-11 RX ADMIN — TRAZODONE HYDROCHLORIDE SCH MG: 50 TABLET ORAL at 21:38

## 2018-06-11 RX ADMIN — Medication SCH MG: at 17:25

## 2018-06-11 RX ADMIN — Medication SCH MG: at 05:40

## 2018-06-11 RX ADMIN — Medication SCH MG: at 09:45

## 2018-06-11 NOTE — CP.PCM.PN
Subjective





- Date & Time of Evaluation


Date of Evaluation: 06/11/18


Time of Evaluation: 09:53





- Subjective


Subjective: 





Patient seen and examined at bedside. Complaining of headache, left flank pain 

and leg pain. Able to ambulate without difficulty. Tolerating diet. Complaining 

of constipation. No other complaints at this time. 





Objective





- Vital Signs/Intake and Output


Vital Signs (last 24 hours): 


 











Temp Pulse Resp BP Pulse Ox


 


 98.4 F   76   20   103/66   96 


 


 06/11/18 08:23  06/11/18 08:23  06/11/18 08:23  06/11/18 08:23  06/11/18 08:23








Intake and Output: 


 











 06/11/18 06/11/18





 06:59 18:59


 


Intake Total 1140 1440


 


Balance 1140 1440














- Medications


Medications: 


 Current Medications





Bisacodyl (Dulcolax)  5 mg PO ONCE ONE


   Stop: 06/11/18 09:49


Docusate Sodium (Colace)  100 mg PO BID [facility]


   Last Admin: 06/11/18 09:45 Dose:  100 mg


Hydromorphone HCl (Dilaudid)  1 mg IVP Q4H PRN


   PRN Reason: Pain, severe (8-10)


Meropenem 1 gm/ Sodium (Chloride)  100 mls @ 100 mls/hr IVPB Q8H [facility]


   PRN Reason: Protocol


   Last Admin: 06/11/18 08:46 Dose:  100 mls/hr


Lactated Ringer's (Lactated Ringer's)  1,000 mls @ 100 mls/hr IV .Q10H [facility]


Ketorolac Tromethamine (Toradol)  30 mg IVP Q6 PRN


   PRN Reason: Pain, Mild (1-3)


Morphine Sulfate (Morphine)  2 mg IVP Q4 PRN


   PRN Reason: Pain, moderate (4-7)


Ondansetron HCl (Zofran Inj)  4 mg IVP Q6H PRN


   PRN Reason: Nausea/Vomiting


Saccharomyces Boulardii (Florastor)  250 mg PO BID [facility]


   Last Admin: 06/11/18 09:45 Dose:  250 mg


Trazodone HCl (Desyrel)  25 mg PO HS [facility]


   Last Admin: 06/10/18 21:32 Dose:  25 mg











- Labs


Labs: 


 





 06/11/18 07:06 





 06/11/18 07:06 





 











PT  11.9 SECONDS (9.7-12.2)   06/08/18  00:55    


 


INR  1.1   06/08/18  00:55    


 


APTT  28 SECONDS (21-34)   06/08/18  00:55    














- Constitutional


Appears: Well





- Head Exam


Head Exam: ATRAUMATIC, NORMAL INSPECTION, NORMOCEPHALIC





- Eye Exam


Eye Exam: EOMI, Normal appearance, PERRL


Pupil Exam: NORMAL ACCOMODATION, PERRL





- ENT Exam


ENT Exam: Mucous Membranes Moist, Normal Exam





- Neck Exam


Neck Exam: Full ROM, Normal Inspection.  absent: Lymphadenopathy





- Respiratory Exam


Respiratory Exam: Clear to Ausculation Bilateral, NORMAL BREATHING PATTERN





- Cardiovascular Exam


Cardiovascular Exam: REGULAR RHYTHM, +S1, +S2.  absent: Murmur





- GI/Abdominal Exam


GI & Abdominal Exam: Soft, Tenderness (suprapubic), Normal Bowel Sounds.  absent

: Distended





- Extremities Exam


Extremities Exam: Full ROM, Normal Capillary Refill, Normal Inspection.  absent

: Joint Swelling, Pedal Edema





- Back Exam


Back Exam: CVA tenderness (L), NORMAL INSPECTION





- Neurological Exam


Neurological Exam: Alert, Awake, CN II-XII Intact, Normal Gait, Oriented x3





- Psychiatric Exam


Psychiatric exam: Normal Affect, Normal Mood





- Skin


Skin Exam: Dry, Intact, Normal Color, Warm





Assessment and Plan


(1) Pyelonephritis due to Escherichia coli


Assessment & Plan: 


Has been afebrile for over 72H. Urine culture E. coli resistant to ampicillin. 

Still having pain L. flank. On Merrem. CT did not show any obstructive uropathy 

but + for L. perinephric stranding. Continue antibiotics, hydration, pain 

control, antiemetics. Complaining of pain in the legs R>L with increased 

swelling and tenderness. Will decrease fluids to LR @ 100 and order doppler of 

the LE. Will repeat Renal US to R/O collection as she is continuing to have 

pain. Patient encouraged to ambulate. 


Status: Acute

## 2018-06-11 NOTE — CON
DATE:  2018



UROLOGY CONSULTATION



REFERRING PHYSICIAN:  Theresa Germain DO



REASON FOR CONSULTATION:  Urinary tract infection.



The patient is a 38-year-old female admitted with abdominal pain and flank

pain.



The patient has history of recurrent urinary tract infection.  The patient

reports that she has history of kidney stones as well.  She is uncertain

regarding the details of the stones.



The patient now presents with a 1-week history of abdominal pain.  The

patient reports suprapubic pain.  She also had urinary frequency.



The patient reports she has had hematuria.



There has been bilateral flank pain, left greater than the right.  The

patient reports the right flank pain has improved.  The left flank pain has

persisted.



The patient had previous episode of nausea and vomiting.



The patient has fair appetite.  No further vomiting.



The patient previously had a fever.  She has been afebrile since this

admission.



PAST SURGICAL HISTORY:  Cholecystectomy, appendectomy, and 

section.



PHYSICAL EXAMINATION:

GENERAL:  The patient is a well-developed, well-nourished middle-aged

female.  The patient is awake and alert.  The patient reports left flank

pain.

VITAL SIGNS:  Stable.  The patient is afebrile.

ABDOMEN: Soft.  There is moderate lower abdominal tenderness.  No mass or

organomegaly.  There is left costovertebral angle tenderness.



LABORATORY DATA:  Reviewed.  Hematocrit is 32, white blood count 9000.  BUN

13, creatinine 0.6.  Urinalysis reveals 114 white blood cells and four red

blood cells per high-power field.



I reviewed the CT scan.  The CT scan demonstrates perinephric stranding on

the left.  There is no urolithiasis noted.



IMPRESSION:

1.  Urinary tract infection.

2.  Pyelonephritis.

3.  Abdominal pain.

4.  Flank pain.

5.  Pyuria.



The patient has urinary tract infection.



The patient is currently receiving antibiotic therapy.



I discussed the case with the attending physician as well as with the

Infectious Disease consultant.



Hydration.  Continue antibiotic therapy.  Monitor clinical course.  Further

therapy to follow according to the patient's clinical course.



Thank you for recommending the patient for urology consultation.





__________________________________________

Shea MD Astrid



cc:  Theresa Germain DO



DD:  06/10/2018 13:59:56

DT:  06/10/2018 14:01:56

[facility] # [id number]

## 2018-06-11 NOTE — US
Renal ultrasound 



History: Pyelonephritis.  Evaluate for collection. 



Comparison: CT scan dated 06/08/2018 and ultrasound dated 06/08/2018 



Technique: Real-time sonography was performed through the kidneys. 



Findings: 



Right kidney: 11.7 x 4.3 x 5.4 centimeters.  No calculi or 

hydronephrosis. 



Left Kidney: 12.1 x 5 4 x 6.2 centimeters.  No calculi or 

hydronephrosis.  Midpole hypoechoic cyst measuring 1.0 x 0.5 x 0.7 

centimeters. 



Visualized aorta is grossly preserved. 



Visualized urinary bladder is grossly preserved. 



Impression: 



1 centimeter left renal cyst. 



Otherwise unremarkable sonographic evaluation of the kidneys. 



If there is concern for pyelonephritis, correlation with a 

contrast-enhanced CT scan of the abdomen and pelvis may be helpful 

for further evaluation.

## 2018-06-11 NOTE — VASCLAB
PROCEDURE:  Lower Extremity Venous Duplex Exam.



HISTORY:

swelling, increased tenderness 



PRIORS:

None. 



TECHNIQUE:

Bilateral common femoral, femoral, popliteal and posterior tibial, 

peroneal and great saphenous veins were evaluated. Flow was assessed 

with color Doppler, compressibility, assessment of phasic flow and 

augmentation response.



Report prepared by   Kaden Wheat, T 



FINDINGS:



RIGHT:

1. Common Femoral Vein: 



1.1. Compressibility - Fully compressible: Thrombus -  None : Flow - 

Phasic: Augmentation -Normal: Reflux - .



2. Femoral Vein:



2.1. Compressibility - Fully compressible: Thrombus -  None : Flow - 

Phasic: Augmentation -Normal: Reflux - .



3. Popliteal Vein: 



3.1. Compressibility - Fully compressible: Thrombus - None :  Flow - 

Phasic: Augmentation -Normal: Reflux - .



4. Posterior Tibial Vein: 



4.1. Compressibility - Fully compressible: Thrombus -  None: Flow - : 

Augmentation -: Reflux - .



5. Peroneal Vein:



5.1. Compressibility - Fully compressible: Thrombus -  None: Flow - : 

Augmentation -: Reflux - .



6. Great Saphenous Vein:

6.1. Compressibility - Fully compressible: Thrombus - None: Flow - 

Phasic: Augmentation - : Reflux - .





LEFT:

1. Common Femoral Vein:



1.1.  Compressibility - Fully compressible: Thrombus -  None: Flow - 

Phasic: Augmentation -Normal: Reflux - .



2. Femoral Vein:



2.1.  Compressibility - Fully compressible: Thrombus -  None: Flow - 

Phasic: Augmentation -Normal: Reflux - .



3. Popliteal Vein:



3.1.  Compressibility - Fully compressible: Thrombus -  None : Flow - 

Phasic: Augmentation -Normal: Reflux - .



4. Posterior Tibial Vein:



4.1.  Compressibility - Fully compressible: Thrombus -  None: Flow - 

: Augmentation -: Reflux - .



5. Peroneal Vein:



5.1.  Compressibility - Fully compressible: Thrombus -  None: Flow - 

: Augmentation -: Reflux - .



6. Great Saphenous Vein:

6.1.  Compressibility - Fully compressible: Thrombus -  None: Flow - 

Phasic: Augmentation - : Reflux - .





OTHER FINDINGS:  Right: None significant.



Left: None significant.



IMPRESSION:

Right: 



No evidence of deep or superficial vein thrombosis of the right lower 

extremity.     



Left: 



No evidence of deep or superficial vein thrombosis of the left lower 

extremity.

## 2018-06-11 NOTE — PCM.URO
Urology Progress Note





- General


General: Tolerating Diet





- Subjective


Abdominal Pain: Yes


Flank Pain: Yes (Less)


Nausea: No


Vomiting: No


Voiding Well: Yes


Dysuria: No


Hematuria: No


[place]: Yes


Dsypnea: No


Chest Pain: No


Fever & Chills: No





- Objective


Lab Studies: Reviewed (culture- negative anemia normal wbc cret=0.6)


Lab Results Last 24 Hours: 


 Laboratory Results - last 24 hr











  06/11/18 06/11/18





  07:06 07:06


 


WBC  4.7 L 


 


RBC  3.52 L 


 


Hgb  9.4 L 


 


Hct  28.3 L 


 


MCV  80.5 L 


 


MCH  26.8 L 


 


MCHC  33.2 


 


RDW  21.2 H 


 


Plt Count  252 


 


MPV  8.6 


 


Neut % (Auto)  35.9 L 


 


Lymph % (Auto)  46.9 H 


 


Mono % (Auto)  11.3 H 


 


Eos % (Auto)  4.7 H 


 


Baso % (Auto)  1.2 


 


Neut # (Auto)  1.7 L 


 


Lymph # (Auto)  2.2 


 


Mono # (Auto)  0.5 


 


Eos # (Auto)  0.2 


 


Baso # (Auto)  0.1 


 


Sodium   140


 


Potassium   4.0


 


Chloride   104


 


Carbon Dioxide   27


 


Anion Gap   12


 


BUN   10


 


Creatinine   0.6 L


 


Est GFR ( Amer)   > 60


 


Est GFR (Non-Af Amer)   > 60


 


Random Glucose   79


 


Calcium   8.5 L


 


Phosphorus   3.3


 


Magnesium   1.9


 


Total Bilirubin   0.4


 


AST   69 H


 


ALT   146 H


 


Alkaline Phosphatase   103


 


Total Protein   6.1 L


 


Albumin   3.2 L


 


Globulin   2.9


 


Albumin/Globulin Ratio   1.1











Intake & Output: 


 Intake & Output











 06/11/18 06/11/18 06/12/18





 06:59 18:59 06:59


 


Intake Total 1140 2720 650


 


Balance 1140 2720 650


 


Intake:   


 


  Intake, IV Amount 800 2000 400


 


    Left Antecubital 800 2000 400


 


  Oral 340 720 250


 


Other:   


 


  # Voids   


 


    Urine, Voided 3 3 4


 


  # Bowel Movements  0 











Vital Signs: 


 Vital Signs - 24 hr











  06/11/18 06/11/18 06/11/18





  00:00 08:23 16:00


 


Temperature 98 F 98.4 F 98.1 F


 


Pulse Rate 75 76 73


 


Respiratory 20 20 20





Rate   


 


Blood Pressure 103/67 103/66 108/64


 


O2 Sat by Pulse 100 96 96





Oximetry   











Imaging Studies: Reviewed





- Physical Exam


Abdominal Exam: Soft, Non-Distended.  absent: Non-Tender


Back: CVA Tenderness (L cva tenderness)


Urine Color: Yellow





- Plan


Additional Information: IMP:  UTI.  L pyelonephritis.  Still with pain.  Rec/P:

  Hydrtion.  Condtinue antibiotic Rx.  Discussed w pt and medical staff





- Date & Time of Note


Date: 06/11/18


Time: 12:45

## 2018-06-11 NOTE — PN
DATE:  06/10/2018



INFECTIOUS DISEASE FOLLOWUP



SUBJECTIVE:  I went to see the patient today, and she was complaining of

headache, and she says she is not better.  She still has suprapubic pain

and left flank pain, and she said she is not better.  She has been on pain

medications, fluids, and IV antibiotics.



PHYSICAL EXAMINATION:

VITAL SIGNS:  T-max is 98.1, pulse is 67, blood pressure 99/61, and

respirations are 20.

GENERAL:  She complained of headache.  She is alert and oriented x3.

HEENT:  Head is atraumatic, normocephalic, otherwise.

NECK:  Supple.

LUNGS:  Clear.

HEART:   S1 and S2 are regular.

ABDOMEN:  Soft and nontender and left CVA tenderness present.

EXTREMITIES:  Have no edema.



LABORATORY DATA:  Labs are noted.  Whit count is 5.8, hemoglobin 9.1,

hematocrit is 26.9, platelets count is 239.  Chemistry showed sodium 138,

potassium 3.8, chloride 105, CO2 is 26, anion gap is 10, BUN is 7,

creatinine is 0.5, so this is unremarkable.  Her AST was elevated, and it

is coming down.  Right now, AST is 78 and ALT is 159.  Urine is better

today, so it looks better, and her blood cultures were negative, and urine

culture had only, which was very Cipro sensitive, actually I was going to

change it to Cipro, but I saw that she is still with lot of pain, and she

still does not look well, so I will continue meropenem at this time and

wait for her to be seen by the urologist again tomorrow, and she had a

bladder ultrasound which showed mild thickening versus under distension of

the urinary bladder, 1 cm left renal cyst, and they were _____ to consider

with the hematuria protocol CAT scan, but at this time I would leave her on

meropenem and IV fluids and see what is happening, as she did have abnormal

CAT scan and that CAT did not show any stone.  She has prior

cholecystectomy and no obstructive uropathy or evidence of recently passed

____ calculus, nonspecific left perinephric stranding for which

pyelonephritis cannot be excluded on enhanced CAT scan, so may be we should

do a CAT scan with contrast or we wait for the urologist to evaluate.  We

will continue with meropenem at this time, and I think her headache is

related to the  problem at this time.





__________________________________________

Perla Kelsey MD





DD:  06/10/2018 19:50:45

DT:  06/10/2018 21:07:38

[facility] # [id number]

## 2018-06-12 VITALS
DIASTOLIC BLOOD PRESSURE: 60 MMHG | SYSTOLIC BLOOD PRESSURE: 100 MMHG | OXYGEN SATURATION: 99 % | HEART RATE: 63 BPM | TEMPERATURE: 98.2 F

## 2018-06-12 LAB
ALBUMIN SERPL-MCNC: 3.4 G/DL (ref 3.5–5)
ALBUMIN/GLOB SERPL: 1.2 {RATIO} (ref 1–2.1)
ALT SERPL-CCNC: 123 U/L (ref 9–52)
AST SERPL-CCNC: 53 U/L (ref 14–36)
BASOPHILS # BLD AUTO: 0.1 K/UL (ref 0–0.2)
BASOPHILS NFR BLD: 1 % (ref 0–2)
BUN SERPL-MCNC: 8 MG/DL (ref 7–17)
CALCIUM SERPL-MCNC: 8.8 MG/DL (ref 8.6–10.4)
EOSINOPHIL # BLD AUTO: 0.2 K/UL (ref 0–0.7)
EOSINOPHIL NFR BLD: 3.4 % (ref 0–4)
ERYTHROCYTE [DISTWIDTH] IN BLOOD BY AUTOMATED COUNT: 21.6 % (ref 11.5–14.5)
GFR NON-AFRICAN AMERICAN: > 60
HGB BLD-MCNC: 9.8 G/DL (ref 11–16)
LYMPHOCYTES # BLD AUTO: 2.5 K/UL (ref 1–4.3)
LYMPHOCYTES NFR BLD AUTO: 46 % (ref 20–40)
MCH RBC QN AUTO: 26.7 PG (ref 27–31)
MCHC RBC AUTO-ENTMCNC: 33.4 G/DL (ref 33–37)
MCV RBC AUTO: 80.2 FL (ref 81–99)
MONOCYTES # BLD: 0.6 K/UL (ref 0–0.8)
MONOCYTES NFR BLD: 10.4 % (ref 0–10)
NEUTROPHILS # BLD: 2.1 K/UL (ref 1.8–7)
NEUTROPHILS NFR BLD AUTO: 39.2 % (ref 50–75)
NRBC BLD AUTO-RTO: 0.1 % (ref 0–2)
PLATELET # BLD: 306 K/UL (ref 130–400)
PMV BLD AUTO: 8.3 FL (ref 7.2–11.7)
RBC # BLD AUTO: 3.66 MIL/UL (ref 3.8–5.2)
WBC # BLD AUTO: 5.4 K/UL (ref 4.8–10.8)

## 2018-06-12 RX ADMIN — Medication SCH MG: at 11:14

## 2018-06-12 NOTE — CP.PCM.DIS
Provider





- Provider


Date of Admission: 


06/08/18 15:48





Attending physician: 


Ayad Woo MD





Primary care physician: 





Clinic


Consults: 





Uro: FARTUN Resendiz


ID: [id number]


Time Spent in preparation of Discharge (in minutes): 45





Diagnosis





- Discharge Diagnosis


(1) Pyelonephritis due to Escherichia coli


Status: Acute   





Hospital Course





- Lab Results


Lab Results: 


 Micro Results





06/07/18 23:30   Blood-Venous   Blood Culture - Preliminary


                            NO GROWTH AFTER 4 DAYS


06/07/18 00:01   Blood-Venous   Blood Culture - Preliminary


                            NO GROWTH AFTER 4 DAYS


06/10/18 11:16   Urine,Catheterized   Urine Culture - Final


                            No Growth (<1,000 CFU/ML)


06/07/18 20:20   Urine,Clean Catch   Urine Culture - Final


                            Escherichia Coli





 Most Recent Lab Values











WBC  5.4 K/uL (4.8-10.8)   06/12/18  06:18    


 


RBC  3.66 Mil/uL (3.80-5.20)  L  06/12/18  06:18    


 


Hgb  9.8 g/dL (11.0-16.0)  L  06/12/18  06:18    


 


Hct  29.3 % (34.0-47.0)  L  06/12/18  06:18    


 


MCV  80.2 fL (81.0-99.0)  L  06/12/18  06:18    


 


MCH  26.7 pg (27.0-31.0)  L  06/12/18  06:18    


 


MCHC  33.4 g/dL (33.0-37.0)   06/12/18  06:18    


 


RDW  21.6 % (11.5-14.5)  H  06/12/18  06:18    


 


Plt Count  306 K/uL (130-400)   06/12/18  06:18    


 


MPV  8.3 fL (7.2-11.7)   06/12/18  06:18    


 


Neut % (Auto)  39.2 % (50.0-75.0)  L  06/12/18  06:18    


 


Lymph % (Auto)  46.0 % (20.0-40.0)  H  06/12/18  06:18    


 


Mono % (Auto)  10.4 % (0.0-10.0)  H  06/12/18  06:18    


 


Eos % (Auto)  3.4 % (0.0-4.0)   06/12/18  06:18    


 


Baso % (Auto)  1.0 % (0.0-2.0)   06/12/18  06:18    


 


Neut # (Auto)  2.1 K/uL (1.8-7.0)   06/12/18  06:18    


 


Lymph # (Auto)  2.5 K/uL (1.0-4.3)   06/12/18  06:18    


 


Mono # (Auto)  0.6 K/uL (0.0-0.8)   06/12/18  06:18    


 


Eos # (Auto)  0.2 K/uL (0.0-0.7)   06/12/18  06:18    


 


Baso # (Auto)  0.1 K/uL (0.0-0.2)   06/12/18  06:18    


 


Neutrophils % (Manual)  83 % (50-75)  H  06/07/18  20:26    


 


Band Neutrophils %  1 % (0-2)   06/07/18  20:26    


 


Lymphocytes % (Manual)  11 % (20-40)  L  06/07/18  20:26    


 


Monocytes % (Manual)  3 % (0-10)   06/07/18  20:26    


 


Eosinophils % (Manual)  2 % (0-4)   06/07/18  20:26    


 


Differential Comment     06/09/18  06:18    


 


Platelet Estimate  Normal  (NORMAL)   06/07/18  20:26    


 


Microcytosis (manual)  Slight   06/07/18  20:26    


 


Retic Count  0.6 % (0.5-1.5)   06/08/18  16:35    


 


PT  11.9 SECONDS (9.7-12.2)   06/08/18  00:55    


 


INR  1.1   06/08/18  00:55    


 


APTT  28 SECONDS (21-34)   06/08/18  00:55    


 


Puncture Site  Rba   06/07/18  21:57    


 


pCO2  31 mm/Hg (35-45)  L  06/07/18  21:57    


 


pO2  106 mm/Hg ()  H  06/07/18  21:57    


 


HCO3  23.3 mmol/L (21-28)   06/07/18  21:57    


 


ABG pH  7.44  (7.35-7.45)   06/07/18  21:57    


 


ABG Total CO2  22.1 mmol/L (22-28)   06/07/18  21:57    


 


ABG O2 Saturation  99.3 % (95-98)  H  06/07/18  21:57    


 


ABG Base Excess  -2.1 mmol/L (-2.0-3.0)  L  06/07/18  21:57    


 


Natalio Test  Pos   06/07/18  21:57    


 


ABG Potassium  3.2 mmol/L (3.6-5.2)  L  06/07/18  21:57    


 


A-a O2 Difference  5.0 mm/Hg  06/07/18  21:57    


 


Respiratory Index  0   06/07/18  21:57    


 


Sodium  135.0 mmol/l (132-148)   06/07/18  21:57    


 


Chloride  107.0 mmol/L ()   06/07/18  21:57    


 


Glucose  91 mg/dl ()   06/07/18  21:57    


 


Lactate  0.6 mmol/L (0.7-2.1)  L  06/07/18  21:57    


 


Liter Flow  0   06/07/18  21:57    


 


FiO2  21.0 %  06/07/18  21:57    


 


Sodium  141 mmol/L (132-148)   06/12/18  06:18    


 


Potassium  4.3 mmol/L (3.6-5.2)   06/12/18  06:18    


 


Chloride  105 mmol/L ()   06/12/18  06:18    


 


Carbon Dioxide  27 mmol/L (22-30)   06/12/18  06:18    


 


Anion Gap  13  (10-20)   06/12/18  06:18    


 


BUN  8 mg/dL (7-17)   06/12/18  06:18    


 


Creatinine  0.5 mg/dL (0.7-1.2)  L  06/12/18  06:18    


 


Est GFR ( Amer)  > 60   06/12/18  06:18    


 


Est GFR (Non-Af Amer)  > 60   06/12/18  06:18    


 


Random Glucose  82 mg/dL ()   06/12/18  06:18    


 


Calcium  8.8 mg/dl (8.6-10.4)   06/12/18  06:18    


 


Phosphorus  3.3 mg/dL (2.5-4.5)   06/12/18  06:18    


 


Magnesium  1.9 mg/dL (1.6-2.3)   06/12/18  06:18    


 


Iron  24 ug/dL ()  L  06/08/18  16:35    


 


TIBC  412 ug/dL (250-450)   06/08/18  16:35    


 


% Saturation  6  (20-55)  L  06/08/18  16:35    


 


Ferritin  33.4 ng/mL  06/08/18  16:35    


 


Total Bilirubin  0.5 mg/dL (0.2-1.3)   06/12/18  06:18    


 


AST  53 U/L (14-36)  H D 06/12/18  06:18    


 


ALT  123 U/L (9-52)  H  06/12/18  06:18    


 


Alkaline Phosphatase  89 U/L ()   06/12/18  06:18    


 


Total Protein  6.3 g/dL (6.3-8.3)   06/12/18  06:18    


 


Albumin  3.4 g/dL (3.5-5.0)  L  06/12/18  06:18    


 


Globulin  2.9 gm/dL (2.2-3.9)   06/12/18  06:18    


 


Albumin/Globulin Ratio  1.2  (1.0-2.1)   06/12/18  06:18    


 


Lipase  142 U/L ()   06/07/18  20:26    


 


Vitamin B12  474 pg/mL (239-931)   06/08/18  16:35    


 


Folate  12.6 ng/mL  06/08/18  16:35    


 


Procalcitonin  0.21 NG/ML (0.19-0.49)   06/08/18  14:57    


 


Arterial Blood Potassium  3.2 mmol/L (3.6-5.2)  L  06/07/18  21:57    


 


Urine Color  Straw  (YELLOW)   06/10/18  17:22    


 


Urine Clarity  Clear  (Clear)   06/10/18  17:22    


 


Urine pH  7.0  (5.0-8.0)   06/10/18  17:22    


 


Ur Specific Gravity  1.004  (1.003-1.030)   06/10/18  17:22    


 


Urine Protein  Negative mg/dL (NEGATIVE)   06/10/18  17:22    


 


Urine Glucose (UA)  Normal mg/dL (Normal)   06/10/18  17:22    


 


Urine Ketones  Negative mg/dL (NEGATIVE)   06/10/18  17:22    


 


Urine Blood  Negative  (NEGATIVE)   06/10/18  17:22    


 


Urine Nitrate  Negative  (NEGATIVE)   06/10/18  17:22    


 


Urine Bilirubin  Negative  (NEGATIVE)   06/10/18  17:22    


 


Urine Urobilinogen  Normal mg/dL (0.2-1.0)   06/10/18  17:22    


 


Ur Leukocyte Esterase  Neg Jamaal/uL (Negative)   06/10/18  17:22    


 


Urine WBC (Auto)  < 1 /hpf (0-5)   06/10/18  17:22    


 


Urine RBC (Auto)  4 /hpf (0-3)  H  06/07/18  20:43    


 


Ur Squamous Epith Cells  1 /hpf (0-5)   06/10/18  17:22    


 


Urine Bacteria  Mod  (<OCC)  H  06/07/18  20:43    


 


Urine HCG, Qual  Negative  (NEGATIVE)   06/07/18  20:20    


 


Blood Type  O POSITIVE   06/08/18  16:35    


 


Antibody Screen  Positive   06/08/18  16:35    


 


Antibody Identification  Anti Jka   06/08/18  16:35    


 


Antigen Identification  Jka Antigen - NEGATIVE   06/08/18  16:35    














- Hospital Course


Hospital Course: 





On Admission:





38-year-old female presents to the ED for evaluation of suprapubic pain for 1 

week. Patient reports 1 week of painful and burning urination with reddish urine

, back pain, and pain in bilateral lower extremities when walking. Patient 

reports fevers and chills which started yesterday, Tmax 103. Patient denies 

taking medication for pain. Patient states this is the first episode. Patient 

reports dizziness, chest pain, shortness of breath, and cough which started 

today. Patient denies nausea/vomiting/diarrhea/constipation, numbness/

paresthesia in extremities, saddle anesthesia. LMP was May 16. Review of 

records shows patient was admitted in March 2018 for symptomatic anemia 

secondary to uterine fibroids. Patient has since followed up with OBGYN who 

recommends hysterectomy but patient has not had surgery yet due to not being 

able to afford it at this time.





Hospital Course:





Patient had pain controlled with morphine. Started on rocephin, switched to 

maxipime then switched to Merrem by ID. Culture came with E.Coli sensitive to 

call but amoxicillin. Symptoms resolved. Patient remained afebrile. Patient was 

discharged home on Cipro for 10 days with instructions to follow up with her 

PMD. Renal US x2 was negative. 





Discharge Exam





- Head Exam


Head Exam: ATRAUMATIC, NORMAL INSPECTION, NORMOCEPHALIC





- Eye Exam


Eye Exam: EOMI, Normal appearance, PERRL


Pupil Exam: NORMAL ACCOMODATION, PERRL





- Respiratory Exam


Respiratory Exam: Clear to PA & Lateral, NORMAL BREATHING PATTERN, UNREMARKABLE





- Cardiovascular Exam


Cardiovascular Exam: REGULAR RHYTHM.  absent: Tachycardia





- GI/Abdominal Exam


GI & Abdominal Exam: Normal Bowel Sounds, Soft, Unremarkable.  absent: Distended

, Tenderness





- Neurological Exam


Neurological exam: Alert, CN II-XII Intact, Normal Gait, Oriented x3, Reflexes 

Normal





- Psychiatric Exam


Psychiatric exam: Normal Affect, Normal Mood





- Skin


Skin Exam: Dry, Intact, Normal Color, Warm





Discharge Plan





- Discharge Medications


Prescriptions: 


Ciprofloxacin HCl [Cipro] 500 mg PO BID 10 Days  tablet


Ondansetron ODT [Zofran ODT] 4 mg PO Q8 #12 odt


Ondansetron ODT [Zofran ODT] 4 mg PO Q6H 10 Days  odt





- Follow Up Plan


Condition: FAIR


Disposition: HOME/ ROUTINE


Instructions:  Sepsis in Adults, Ciprofloxacin (Systemic), Urinary Tract 

Infection, Adult (DC), Ondansetron, Urinary Tract Infection in Women (DC), 

Urinary Tract Infection in Men (DC), Dysuria (GEN)


Additional Instructions: 


Please follow up with your primary doctor in 7-10 days. If you do not have a 

primary care doctor please follow up in our clinic. I have attached the 

information to our clinic. Please take Ciprofloxacin 500mg by mouth twice daily 

for 14 days. Please come back to the ED if symptoms return.

## 2018-07-10 ENCOUNTER — HOSPITAL ENCOUNTER (EMERGENCY)
Dept: HOSPITAL 31 - C.ER | Age: 38
Discharge: HOME | End: 2018-07-10
Payer: COMMERCIAL

## 2018-07-10 VITALS — OXYGEN SATURATION: 100 %

## 2018-07-10 VITALS
TEMPERATURE: 98.9 F | DIASTOLIC BLOOD PRESSURE: 77 MMHG | HEART RATE: 75 BPM | RESPIRATION RATE: 16 BRPM | SYSTOLIC BLOOD PRESSURE: 117 MMHG

## 2018-07-10 VITALS — BODY MASS INDEX: 24.5 KG/M2

## 2018-07-10 DIAGNOSIS — F32.9: ICD-10-CM

## 2018-07-10 DIAGNOSIS — N39.0: Primary | ICD-10-CM

## 2018-07-10 LAB
ALBUMIN SERPL-MCNC: 4.4 G/DL (ref 3.5–5)
ALBUMIN/GLOB SERPL: 1.3 {RATIO} (ref 1–2.1)
ALT SERPL-CCNC: 38 U/L (ref 9–52)
AST SERPL-CCNC: 26 U/L (ref 14–36)
BASOPHILS # BLD AUTO: 0.1 K/UL (ref 0–0.2)
BASOPHILS NFR BLD: 0.8 % (ref 0–2)
BILIRUB UR-MCNC: NEGATIVE MG/DL
BUN SERPL-MCNC: 10 MG/DL (ref 7–17)
CALCIUM SERPL-MCNC: 9.4 MG/DL (ref 8.6–10.4)
EOSINOPHIL # BLD AUTO: 0.1 K/UL (ref 0–0.7)
EOSINOPHIL NFR BLD: 1.5 % (ref 0–4)
ERYTHROCYTE [DISTWIDTH] IN BLOOD BY AUTOMATED COUNT: 15.2 % (ref 11.5–14.5)
GFR NON-AFRICAN AMERICAN: > 60
GLUCOSE UR STRIP-MCNC: (no result) MG/DL
HGB BLD-MCNC: 10.9 G/DL (ref 11–16)
LEUKOCYTE ESTERASE UR-ACNC: (no result) LEU/UL
LYMPHOCYTES # BLD AUTO: 1.9 K/UL (ref 1–4.3)
LYMPHOCYTES NFR BLD AUTO: 25.2 % (ref 20–40)
MCH RBC QN AUTO: 26.2 PG (ref 27–31)
MCHC RBC AUTO-ENTMCNC: 32.6 G/DL (ref 33–37)
MCV RBC AUTO: 80.2 FL (ref 81–99)
MONOCYTES # BLD: 0.6 K/UL (ref 0–0.8)
MONOCYTES NFR BLD: 7.2 % (ref 0–10)
NEUTROPHILS # BLD: 5 K/UL (ref 1.8–7)
NEUTROPHILS NFR BLD AUTO: 65.3 % (ref 50–75)
NRBC BLD AUTO-RTO: 0 % (ref 0–2)
PH UR STRIP: 6 [PH] (ref 5–8)
PLATELET # BLD: 233 K/UL (ref 130–400)
PMV BLD AUTO: 8.7 FL (ref 7.2–11.7)
PROT UR STRIP-MCNC: (no result) MG/DL
RBC # BLD AUTO: 4.17 MIL/UL (ref 3.8–5.2)
RBC # UR STRIP: (no result) /UL
SP GR UR STRIP: 1.02 (ref 1–1.03)
SQUAMOUS EPITHIAL: 44 /HPF (ref 0–5)
UROBILINOGEN UR-MCNC: NORMAL MG/DL (ref 0.2–1)
WBC # BLD AUTO: 7.7 K/UL (ref 4.8–10.8)

## 2018-07-10 NOTE — C.PDOC
History Of Present Illness





39 y/o female with Hx of depression and anxiety presents to ED for complaints 

of feeling depressed. Patient also reports headache from crying after she had 

an argument with her boyfriend, lower back pain, and dysuria. Patient states 

she missed her appointment with her psychiatrist and ran out of medications. 

Patient is currently requesting psychiatric evaluation. Patient also states she 

was treated for UTI a month ago and is concerned symptoms are reoccurring. 

Denies SI, HI or any other physical complaints. 


Time Seen by Provider: 07/10/18 01:47


Chief Complaint (Nursing): Medical Clearance





Past Medical History


Vital Signs: 


 Last Vital Signs











Temp  97.7 F   07/10/18 04:34


 


Pulse  85   07/10/18 04:34


 


Resp  17   07/10/18 04:34


 


BP  99/64 L  07/10/18 04:34


 


Pulse Ox  100   07/10/18 04:34














- Medical History


PMH: Anemia, Depression, Migraine


   Denies: Diabetes, Hepatitis, HIV, HTN, Chronic Kidney Disease, Seizures, 

Sexually Transmitted Disease


Surgical History: Appendectomy, Cholecystectomy, 





- [facility] Procedures








EXCISION OF DUODENUM, ENDO, DIAGN (18)


EXCISION OF STOMACH, ENDO, DIAGN (18)


TRANSFUSE NONAUT RED BLOOD CELLS IN PERIPH VEIN, PERC (18)








Family History: States: Unknown Family Hx





- Social History


Hx Tobacco Use: No


Hx Alcohol Use: No


Hx Substance Use: No





- Immunization History


Hx Tetanus Toxoid Vaccination: No


Hx Influenza Vaccination: No


Hx Pneumococcal Vaccination: No





Review Of Systems


Constitutional: Negative for: Fever, Chills


Gastrointestinal: Negative for: Nausea, Vomiting, Abdominal Pain, Diarrhea


Genitourinary: Positive for: Dysuria


Musculoskeletal: Positive for: Back Pain (Lower)


Skin: Negative for: Rash


Neurological: Positive for: Headache.  Negative for: Weakness, Numbness


Psych: Positive for: Anxiety, Depression.  Negative for: Suicidal ideation





Physical Exam





- Physical Exam


Appears: Well, Non-toxic, No Acute Distress


Skin: Normal Color, Warm, Dry, No Rash


Head: Atraumatic, Normacephalic


Eye(s): bilateral: Normal Inspection, PERRL, EOMI


Oral Mucosa: Moist


Neck: Supple


Chest: Symmetrical


Cardiovascular: Rhythm Regular


Respiratory: Normal Breath Sounds, No Decreased Breath Sounds, No Rales, No 

Rhonchi, No Wheezing


Gastrointestinal/Abdominal: Soft, No Tenderness, No Distention


Back: Normal Inspection, No CVA Tenderness, No Vertebral Tenderness


Extremity: Normal ROM, No Deformity


Extremity: Bilateral: Atraumatic, Normal Color And Temperature, Normal ROM


Neurological/Psych: Oriented x3, Normal Speech


Gait: Steady





ED Course And Treatment





- Laboratory Results


Result Diagrams: 


 07/10/18 02:38





 07/10/18 02:38


O2 Sat by Pulse Oximetry: 100 (RA)


Pulse Ox Interpretation: Normal


Progress Note: Adminsitered Motrin.  Ordered blood work and UA.  Crisis 

notified. Macrobid PO ordered. Pt is medically cleared for crisis evaluation. 

If pt will be dc home macrobid PO can be prescribed





Disposition





- Disposition


Disposition: HOME/ ROUTINE


Disposition Time: 06:30


Condition: STABLE


Forms:  [facility] (English)





- Clinical Impression


Clinical Impression: 


 Depression, Urinary tract infection








- PA / NP / Resident Statement


MD/DO has reviewed & agrees with the documentation as recorded.





- Scribe Statement


The provider has reviewed the documentation as recorded by the Scribkee Soler





All medical record entries made by the Scribe were at my direction and 

personally dictated by me. I have reviewed the chart and agree that the record 

accurately reflects my personal performance of the history, physical exam, 

medical decision making, and the department course for this patient. I have 

also personally directed, reviewed, and agree with the discharge instructions 

and disposition.





Physician Patient Turnover


Patient Signed Over To: Suki Whitehead


Handoff Comments: pending crisis evaluation

## 2018-10-11 ENCOUNTER — HOSPITAL ENCOUNTER (OUTPATIENT)
Dept: HOSPITAL 14 - H.ER | Age: 38
Setting detail: OBSERVATION
LOS: 1 days | Discharge: HOME | End: 2018-10-12
Payer: MEDICAID

## 2018-10-11 VITALS — TEMPERATURE: 98 F

## 2018-10-11 VITALS — BODY MASS INDEX: 25.1 KG/M2

## 2018-10-11 DIAGNOSIS — T45.0X1A: Primary | ICD-10-CM

## 2018-10-11 DIAGNOSIS — F32.9: ICD-10-CM

## 2018-10-11 LAB
ALBUMIN SERPL-MCNC: 4.2 G/DL (ref 3.5–5)
ALBUMIN/GLOB SERPL: 1.1 {RATIO} (ref 1–2.1)
ALT SERPL-CCNC: 25 U/L (ref 9–52)
APAP SERPL-MCNC: < 10 UG/ML (ref 10–30)
AST SERPL-CCNC: 51 U/L (ref 14–36)
BASOPHILS # BLD AUTO: 0.1 K/UL (ref 0–0.2)
BASOPHILS NFR BLD: 1.3 % (ref 0–2)
BILIRUB UR-MCNC: NEGATIVE MG/DL
BILIRUBIN,DIRECT: 0.4 MG/ML (ref 0–0.4)
BUN SERPL-MCNC: 7 MG/DL (ref 7–17)
CALCIUM SERPL-MCNC: 9.3 MG/DL (ref 8.4–10.2)
COLOR UR: COLORLESS
EOSINOPHIL # BLD AUTO: 0 K/UL (ref 0–0.7)
EOSINOPHIL NFR BLD: 0.4 % (ref 0–4)
ERYTHROCYTE [DISTWIDTH] IN BLOOD BY AUTOMATED COUNT: 15.4 % (ref 11.5–14.5)
GFR NON-AFRICAN AMERICAN: > 60
GLUCOSE UR STRIP-MCNC: (no result) MG/DL
HGB BLD-MCNC: 10.4 G/DL (ref 12–16)
LEUKOCYTE ESTERASE UR-ACNC: (no result) LEU/UL
LYMPHOCYTES # BLD AUTO: 1.4 K/UL (ref 1–4.3)
LYMPHOCYTES NFR BLD AUTO: 27.6 % (ref 20–40)
MCH RBC QN AUTO: 25.9 PG (ref 27–31)
MCHC RBC AUTO-ENTMCNC: 32.7 G/DL (ref 33–37)
MCV RBC AUTO: 79.3 FL (ref 81–99)
MONOCYTES # BLD: 0.3 K/UL (ref 0–0.8)
MONOCYTES NFR BLD: 6.3 % (ref 0–10)
NEUTROPHILS # BLD: 3.2 K/UL (ref 1.8–7)
NEUTROPHILS NFR BLD AUTO: 64.4 % (ref 50–75)
NRBC BLD AUTO-RTO: 0 % (ref 0–0)
PH UR STRIP: 7 [PH] (ref 5–8)
PLATELET # BLD: 303 K/UL (ref 130–400)
PMV BLD AUTO: 9 FL (ref 7.2–11.7)
PROT UR STRIP-MCNC: NEGATIVE MG/DL
RBC # BLD AUTO: 4 MIL/UL (ref 3.8–5.2)
RBC # UR STRIP: NEGATIVE /UL
SALICYLATES SERPL-MCNC: < 1 MG/DL
SP GR UR STRIP: < 1.005 (ref 1–1.03)
SQUAMOUS EPITHIAL: 1 /HPF (ref 0–5)
URINE CLARITY: CLEAR
UROBILINOGEN UR-MCNC: (no result) MG/DL (ref 0.2–1)
WBC # BLD AUTO: 5 K/UL (ref 4.8–10.8)

## 2018-10-11 PROCEDURE — 81003 URINALYSIS AUTO W/O SCOPE: CPT

## 2018-10-11 PROCEDURE — 83992 ASSAY FOR PHENCYCLIDINE: CPT

## 2018-10-11 PROCEDURE — 80324 DRUG SCREEN AMPHETAMINES 1/2: CPT

## 2018-10-11 PROCEDURE — 80345 DRUG SCREENING BARBITURATES: CPT

## 2018-10-11 PROCEDURE — 80329 ANALGESICS NON-OPIOID 1 OR 2: CPT

## 2018-10-11 PROCEDURE — 80048 BASIC METABOLIC PNL TOTAL CA: CPT

## 2018-10-11 PROCEDURE — 80361 OPIATES 1 OR MORE: CPT

## 2018-10-11 PROCEDURE — 93005 ELECTROCARDIOGRAM TRACING: CPT

## 2018-10-11 PROCEDURE — 85025 COMPLETE CBC W/AUTO DIFF WBC: CPT

## 2018-10-11 PROCEDURE — 80320 DRUG SCREEN QUANTALCOHOLS: CPT

## 2018-10-11 PROCEDURE — 71045 X-RAY EXAM CHEST 1 VIEW: CPT

## 2018-10-11 PROCEDURE — 80358 DRUG SCREENING METHADONE: CPT

## 2018-10-11 PROCEDURE — 80353 DRUG SCREENING COCAINE: CPT

## 2018-10-11 PROCEDURE — 80346 BENZODIAZEPINES1-12: CPT

## 2018-10-11 PROCEDURE — 80349 CANNABINOIDS NATURAL: CPT

## 2018-10-11 PROCEDURE — 81025 URINE PREGNANCY TEST: CPT

## 2018-10-11 PROCEDURE — 99285 EMERGENCY DEPT VISIT HI MDM: CPT

## 2018-10-11 PROCEDURE — 80076 HEPATIC FUNCTION PANEL: CPT

## 2018-10-11 NOTE — ED PDOC
HPI: Psych/Substance Abuse


Time Seen by Provider: 10/11/18 17:00


Chief Complaint (Nursing): Psychiatric Evaluation


Chief Complaint (Provider): Benadryl overdose


History Per: Patient,  (Ashley Ross #[id number])


History/Exam Limitations: no limitations


Associated Symptoms: denies: Suicidal Thoughts, Suicidal Plan


Additional Complaint(s): 


39yo female, no past medical history, brought to ER by EMS for evaluation s/p 

possible suicide attempt. Per EMS, patient's family called them after patient 

noted to take pills at home. Upon questioning, patient admits to taking 4 

Benadryl pills and states she is feeling very anxious. She denies any suicidal 

or homicidal ideation; patient also denies any medical complaints.





PMD: None





Past Medical History


Reviewed: Historical Data, Nursing Documentation, Vital Signs


Vital Signs: 





                                Last Vital Signs











Temp  98.0 F   10/11/18 16:52


 


Pulse  116 H  10/11/18 16:52


 


Resp  16   10/11/18 16:52


 


BP  141/87   10/11/18 16:52


 


Pulse Ox  99   10/11/18 16:52














- Medical History


PMH: Anemia, Depression, Migraine


   Denies: Diabetes, Hepatitis, HIV, HTN, Chronic Kidney Disease, Seizures, 

Sexually Transmitted Disease





- Surgical History


Surgical History: Appendectomy, Cholecystectomy, 





- Family History


Family History: States: Unknown Family Hx





- Living Arrangements


Living Arrangements: With Family





- Social History


Current smoker - smoking cessation education provided: No


Alcohol: None


Drugs: Denies





- Immunization History


Hx Tetanus Toxoid Vaccination: No


Hx Influenza Vaccination: No


Hx Pneumococcal Vaccination: No





- Home Medications


Home Medications: 


                                Ambulatory Orders











 Medication  Instructions  Recorded


 


Ondansetron ODT [Zofran ODT] 4 mg PO Q8 #12 odt 18


 


Ciprofloxacin HCl [Cipro] 500 mg PO BID 10 Days  tablet 18


 


Ondansetron ODT [Zofran ODT] 4 mg PO Q6H 10 Days  odt 18


 


Sulfamethoxazole/Trimethoprim 1 tab PO BID #20 tab 07/10/18





[Bactrim  mg-160 mg]  














- Allergies


Allergies/Adverse Reactions: 


                                    Allergies











Allergy/AdvReac Type Severity Reaction Status Date / Time


 


No Known Allergies Allergy   Verified 10/11/18 16:52














Review of Systems


ROS Statement: Except As Marked, All Systems Reviewed And Found Negative


Psych: Positive for: Other (Took 4x benadryl tablets).  Negative for: Suicidal 

ideation





Physical Exam





- Reviewed


Nursing Documentation Reviewed: Yes


Vital Signs Reviewed: Yes





- Physical Exam


Appears: Positive for: No Acute Distress


Head Exam: Positive for: ATRAUMATIC, NORMAL INSPECTION, NORMOCEPHALIC


Skin: Positive for: Normal Color


Eye Exam: Positive for: Normal appearance, EOMI, PERRL


Neck: Positive for: Normal, Supple


Cardiovascular/Chest: Positive for: Tachycardia


Respiratory: Positive for: Normal Breath Sounds


Gastrointestinal/Abdominal: Positive for: Normal Exam, Soft


Back: Positive for: Normal Inspection


Extremity: Positive for: Normal ROM, Other (superficial cuts on wrists)


Neurologic/Psych: Positive for: Oriented, Other (sleepy but arousable).  

Negative for: Motor/Sensory Deficits





- Laboratory Results


Result Diagrams: 


                                 10/11/18 19:15





                                 10/11/18 19:15





- ECG


ECG: Positive for: Interpreted By Me, Viewed By Me


ECG Rhythm: Positive for: Sinus Rhythm


Rate: 97


O2 Sat by Pulse Oximetry: 99 (RA)


Pulse Ox Interpretation: Normal





Medical Decision Making


Medical Decision Making: 


Impression: Patient reported to have taken 4x benadryl (25 mg), suicide attempt


Plan:


* EKG


* Labs


* Urinalysis


* Poison control consult





21:13


Patient seen and evaluated by crisis team.


Patient with persistent drowsiness, patient to be observed in the ER





22:21


pt more alert and awake. ambulaating around the room. coherent, oriented X3.  pt

no longer somnolent. vitals stable


pt has been observed in the ER. 





Patient now requesting to go home; patient to be re-evaluated by crisis team.





22:45


Per crisis worker, patient needs to be screened by [facility] for involuntary 

admission.


Patient currently pending [facility] evaluation.





22:55


Patient is medically cleared for [facility] screening 





Scribe Attestation:


Documented by Mari Dominguez, acting as a scribe for Maria R Pierre MD.





Provider Scribe Attestation:


All medical record entries made by the Scribe were at my direction and 

personally dictated by me. I have reviewed the chart and agree that the record 

accurately reflects my personal performance of the history, physical exam, 

medical decision making, and the department course for this patient. I have also

personally directed, reviewed, and agree with the discharge instructions and 

disposition.








Disposition





- Clinical Impression


Clinical Impression: 


 Depression








- Patient ED Disposition


Is Patient to be Admitted: Transfer of Care





- Disposition


Disposition: Transfer of Care


Disposition Time: 23:53


Condition: STABLE


Patient Signed Over To: Jeanmarie Beach

## 2018-10-12 VITALS — RESPIRATION RATE: 14 BRPM | HEART RATE: 82 BPM | DIASTOLIC BLOOD PRESSURE: 57 MMHG | SYSTOLIC BLOOD PRESSURE: 95 MMHG

## 2018-10-12 VITALS — OXYGEN SATURATION: 99 %

## 2018-10-12 NOTE — CARD
--------------- APPROVED REPORT --------------





Date of service: 10/11/2018



EKG Measurement

Heart [id number]

AR [id number]

[id number]

[id number]

[id number]



<Conclusion>

Normal sinus rhythm

Normal ECG

## 2018-10-12 NOTE — RAD
Date of service: 



10/11/2018



HISTORY:

 psych 



COMPARISON:

No prior. 



FINDINGS:



LUNGS:

No active pulmonary disease.



PLEURA:

No significant pleural effusion identified, no pneumothorax apparent.



CARDIOVASCULAR:

Normal.



OSSEOUS STRUCTURES:

No significant abnormalities.



VISUALIZED UPPER ABDOMEN:

Surgical clips are identified in the right upper quadrant abdomen.



OTHER FINDINGS:

None.



IMPRESSION:

No acute cardiopulmonary disease appreciated.

## 2018-10-12 NOTE — ED PDOC
- Laboratory Results


Result Diagrams: 


                                 10/11/18 19:15





                                 10/11/18 19:15





- ECG


O2 Sat by Pulse Oximetry: 99 (RA)





Medical Decision Making


Medical Decision Makin:00


At this time patient was endorsed to provider by Dr. Pierre pending [facility] 

screening. 








06:00


-Patient was evaluated by [facility] screener, he does not meet criteria for 

involuntary admission and is medically stable for discharge home. Diagnosis of 

depressive disorder. 





----------------------------

---------------------------------------------------------





Scribe Attestation:


Documented by Cristian Priest, acting as a scribe for Jeanmarie Beach MD.





Provider Scribe Attestation:


All medical record entries made by the Scribe were at my direction and 

personally dictated by me. I have reviewed the chart and agree that the record 

accurately reflects my personal performance of the history, physical exam, 

medical decision making, and the department course for this patient. I have also

personally directed, reviewed, and agree with the discharge instructions and 

disposition.





Disposition





- Clinical Impression


Clinical Impression: 


 Depression








- Disposition


Disposition: Routine/Home


Disposition Time: 06:00


Condition: STABLE